# Patient Record
Sex: FEMALE | Race: WHITE | NOT HISPANIC OR LATINO | Employment: FULL TIME | URBAN - METROPOLITAN AREA
[De-identification: names, ages, dates, MRNs, and addresses within clinical notes are randomized per-mention and may not be internally consistent; named-entity substitution may affect disease eponyms.]

---

## 2018-06-05 ENCOUNTER — LAB VISIT (OUTPATIENT)
Dept: LAB | Facility: HOSPITAL | Age: 28
End: 2018-06-05
Attending: OBSTETRICS & GYNECOLOGY
Payer: COMMERCIAL

## 2018-06-05 DIAGNOSIS — N92.1 MENOMETRORRHAGIA: Primary | ICD-10-CM

## 2018-06-05 LAB
ABO + RH BLD: NORMAL
ANION GAP SERPL CALC-SCNC: 8 MMOL/L
B-HCG UR QL: NEGATIVE
BLD GP AB SCN CELLS X3 SERPL QL: NORMAL
BUN SERPL-MCNC: 9 MG/DL
CALCIUM SERPL-MCNC: 9.4 MG/DL
CHLORIDE SERPL-SCNC: 105 MMOL/L
CO2 SERPL-SCNC: 25 MMOL/L
CREAT SERPL-MCNC: 0.8 MG/DL
ERYTHROCYTE [DISTWIDTH] IN BLOOD BY AUTOMATED COUNT: 12.2 %
EST. GFR  (AFRICAN AMERICAN): >60 ML/MIN/1.73 M^2
EST. GFR  (NON AFRICAN AMERICAN): >60 ML/MIN/1.73 M^2
GLUCOSE SERPL-MCNC: 82 MG/DL
HCT VFR BLD AUTO: 37.8 %
HGB BLD-MCNC: 12.5 G/DL
MCH RBC QN AUTO: 29.3 PG
MCHC RBC AUTO-ENTMCNC: 33.1 G/DL
MCV RBC AUTO: 89 FL
PLATELET # BLD AUTO: 259 K/UL
PMV BLD AUTO: 10.4 FL
POTASSIUM SERPL-SCNC: 3.9 MMOL/L
RBC # BLD AUTO: 4.26 M/UL
SODIUM SERPL-SCNC: 138 MMOL/L
WBC # BLD AUTO: 6.83 K/UL

## 2018-06-05 PROCEDURE — 80048 BASIC METABOLIC PNL TOTAL CA: CPT

## 2018-06-05 PROCEDURE — 85027 COMPLETE CBC AUTOMATED: CPT

## 2018-06-05 PROCEDURE — 81025 URINE PREGNANCY TEST: CPT

## 2018-06-05 PROCEDURE — 86901 BLOOD TYPING SEROLOGIC RH(D): CPT

## 2018-06-05 PROCEDURE — 36415 COLL VENOUS BLD VENIPUNCTURE: CPT

## 2018-06-06 ENCOUNTER — SURGERY (OUTPATIENT)
Age: 28
End: 2018-06-06

## 2018-06-06 ENCOUNTER — ANESTHESIA (OUTPATIENT)
Dept: SURGERY | Facility: HOSPITAL | Age: 28
End: 2018-06-06
Payer: COMMERCIAL

## 2018-06-06 ENCOUNTER — ANESTHESIA EVENT (OUTPATIENT)
Dept: SURGERY | Facility: HOSPITAL | Age: 28
End: 2018-06-06
Payer: COMMERCIAL

## 2018-06-06 ENCOUNTER — HOSPITAL ENCOUNTER (OUTPATIENT)
Facility: HOSPITAL | Age: 28
Discharge: HOME | End: 2018-06-06
Attending: OBSTETRICS & GYNECOLOGY | Admitting: OBSTETRICS & GYNECOLOGY
Payer: MEDICAID

## 2018-06-06 VITALS
BODY MASS INDEX: 28.32 KG/M2 | TEMPERATURE: 98 F | RESPIRATION RATE: 16 BRPM | HEART RATE: 64 BPM | SYSTOLIC BLOOD PRESSURE: 120 MMHG | OXYGEN SATURATION: 98 % | WEIGHT: 170 LBS | HEIGHT: 65 IN | DIASTOLIC BLOOD PRESSURE: 83 MMHG

## 2018-06-06 DIAGNOSIS — N92.1 MENOMETRORRHAGIA: ICD-10-CM

## 2018-06-06 PROBLEM — N94.6 DYSMENORRHEA: Status: ACTIVE | Noted: 2018-06-06

## 2018-06-06 PROBLEM — N94.10 DYSPAREUNIA, FEMALE: Status: ACTIVE | Noted: 2018-06-06

## 2018-06-06 PROCEDURE — 88305 TISSUE EXAM BY PATHOLOGIST: CPT | Performed by: PATHOLOGY

## 2018-06-06 PROCEDURE — 71000015 HC POSTOP RECOV 1ST HR: Performed by: OBSTETRICS & GYNECOLOGY

## 2018-06-06 PROCEDURE — 00952 ANES VAG PX HYSTSC&/HSG: CPT | Performed by: OBSTETRICS & GYNECOLOGY

## 2018-06-06 PROCEDURE — 36000708 HC OR TIME LEV III 1ST 15 MIN: Performed by: OBSTETRICS & GYNECOLOGY

## 2018-06-06 PROCEDURE — 36000709 HC OR TIME LEV III EA ADD 15 MIN: Performed by: OBSTETRICS & GYNECOLOGY

## 2018-06-06 PROCEDURE — D9220A PRA ANESTHESIA: Mod: CRNA,,, | Performed by: NURSE ANESTHETIST, CERTIFIED REGISTERED

## 2018-06-06 PROCEDURE — 37000009 HC ANESTHESIA EA ADD 15 MINS: Performed by: OBSTETRICS & GYNECOLOGY

## 2018-06-06 PROCEDURE — 88305 TISSUE EXAM BY PATHOLOGIST: CPT | Mod: 26,,, | Performed by: PATHOLOGY

## 2018-06-06 PROCEDURE — 63600175 PHARM REV CODE 636 W HCPCS: Performed by: ANESTHESIOLOGY

## 2018-06-06 PROCEDURE — 25000003 PHARM REV CODE 250: Performed by: OBSTETRICS & GYNECOLOGY

## 2018-06-06 PROCEDURE — 71000033 HC RECOVERY, INTIAL HOUR: Performed by: OBSTETRICS & GYNECOLOGY

## 2018-06-06 PROCEDURE — 63600175 PHARM REV CODE 636 W HCPCS: Performed by: NURSE ANESTHETIST, CERTIFIED REGISTERED

## 2018-06-06 PROCEDURE — 63600175 PHARM REV CODE 636 W HCPCS: Performed by: OBSTETRICS & GYNECOLOGY

## 2018-06-06 PROCEDURE — S0028 INJECTION, FAMOTIDINE, 20 MG: HCPCS | Performed by: OBSTETRICS & GYNECOLOGY

## 2018-06-06 PROCEDURE — 27201423 OPTIME MED/SURG SUP & DEVICES STERILE SUPPLY: Performed by: OBSTETRICS & GYNECOLOGY

## 2018-06-06 PROCEDURE — 25000003 PHARM REV CODE 250: Performed by: NURSE ANESTHETIST, CERTIFIED REGISTERED

## 2018-06-06 PROCEDURE — 37000008 HC ANESTHESIA 1ST 15 MINUTES: Performed by: OBSTETRICS & GYNECOLOGY

## 2018-06-06 PROCEDURE — D9220A PRA ANESTHESIA: Mod: ANES,,, | Performed by: ANESTHESIOLOGY

## 2018-06-06 RX ORDER — FAMOTIDINE 20 MG/50ML
20 INJECTION, SOLUTION INTRAVENOUS ONCE
Status: COMPLETED | OUTPATIENT
Start: 2018-06-06 | End: 2018-06-06

## 2018-06-06 RX ORDER — DEXAMETHASONE SODIUM PHOSPHATE 4 MG/ML
INJECTION, SOLUTION INTRA-ARTICULAR; INTRALESIONAL; INTRAMUSCULAR; INTRAVENOUS; SOFT TISSUE
Status: DISCONTINUED | OUTPATIENT
Start: 2018-06-06 | End: 2018-06-06

## 2018-06-06 RX ORDER — SODIUM CHLORIDE, SODIUM LACTATE, POTASSIUM CHLORIDE, CALCIUM CHLORIDE 600; 310; 30; 20 MG/100ML; MG/100ML; MG/100ML; MG/100ML
125 INJECTION, SOLUTION INTRAVENOUS CONTINUOUS
Status: DISCONTINUED | OUTPATIENT
Start: 2018-06-06 | End: 2018-06-06 | Stop reason: HOSPADM

## 2018-06-06 RX ORDER — GLYCOPYRROLATE 0.2 MG/ML
INJECTION INTRAMUSCULAR; INTRAVENOUS
Status: DISCONTINUED | OUTPATIENT
Start: 2018-06-06 | End: 2018-06-06

## 2018-06-06 RX ORDER — MEPERIDINE HYDROCHLORIDE 50 MG/ML
INJECTION INTRAMUSCULAR; INTRAVENOUS; SUBCUTANEOUS
Status: DISCONTINUED | OUTPATIENT
Start: 2018-06-06 | End: 2018-06-06

## 2018-06-06 RX ORDER — MORPHINE SULFATE 10 MG/ML
2 INJECTION INTRAMUSCULAR; INTRAVENOUS; SUBCUTANEOUS EVERY 5 MIN PRN
Status: DISCONTINUED | OUTPATIENT
Start: 2018-06-06 | End: 2018-06-06 | Stop reason: HOSPADM

## 2018-06-06 RX ORDER — MIDAZOLAM HYDROCHLORIDE 1 MG/ML
INJECTION, SOLUTION INTRAMUSCULAR; INTRAVENOUS
Status: DISCONTINUED | OUTPATIENT
Start: 2018-06-06 | End: 2018-06-06

## 2018-06-06 RX ORDER — BUPIVACAINE HYDROCHLORIDE AND EPINEPHRINE 5; 5 MG/ML; UG/ML
INJECTION, SOLUTION EPIDURAL; INTRACAUDAL; PERINEURAL
Status: DISCONTINUED | OUTPATIENT
Start: 2018-06-06 | End: 2018-06-06 | Stop reason: HOSPADM

## 2018-06-06 RX ORDER — ONDANSETRON 2 MG/ML
4 INJECTION INTRAMUSCULAR; INTRAVENOUS DAILY PRN
Status: DISCONTINUED | OUTPATIENT
Start: 2018-06-06 | End: 2018-06-06 | Stop reason: HOSPADM

## 2018-06-06 RX ORDER — LIDOCAINE HYDROCHLORIDE 10 MG/ML
1 INJECTION, SOLUTION EPIDURAL; INFILTRATION; INTRACAUDAL; PERINEURAL ONCE
Status: CANCELLED | OUTPATIENT
Start: 2018-06-06 | End: 2018-06-06

## 2018-06-06 RX ORDER — NEOSTIGMINE METHYLSULFATE 1 MG/ML
INJECTION, SOLUTION INTRAVENOUS
Status: DISCONTINUED | OUTPATIENT
Start: 2018-06-06 | End: 2018-06-06

## 2018-06-06 RX ORDER — SODIUM CHLORIDE, SODIUM LACTATE, POTASSIUM CHLORIDE, CALCIUM CHLORIDE 600; 310; 30; 20 MG/100ML; MG/100ML; MG/100ML; MG/100ML
INJECTION, SOLUTION INTRAVENOUS CONTINUOUS
Status: DISCONTINUED | OUTPATIENT
Start: 2018-06-06 | End: 2018-06-06 | Stop reason: HOSPADM

## 2018-06-06 RX ORDER — DIPHENHYDRAMINE HYDROCHLORIDE 50 MG/ML
12.5 INJECTION INTRAMUSCULAR; INTRAVENOUS
Status: DISCONTINUED | OUTPATIENT
Start: 2018-06-06 | End: 2018-06-06 | Stop reason: HOSPADM

## 2018-06-06 RX ORDER — PROPOFOL 10 MG/ML
VIAL (ML) INTRAVENOUS
Status: DISCONTINUED | OUTPATIENT
Start: 2018-06-06 | End: 2018-06-06

## 2018-06-06 RX ORDER — SUCCINYLCHOLINE CHLORIDE 20 MG/ML
INJECTION INTRAMUSCULAR; INTRAVENOUS
Status: DISCONTINUED | OUTPATIENT
Start: 2018-06-06 | End: 2018-06-06

## 2018-06-06 RX ORDER — FAMOTIDINE 10 MG/ML
20 INJECTION INTRAVENOUS ONCE
Status: CANCELLED | OUTPATIENT
Start: 2018-06-06 | End: 2018-06-06

## 2018-06-06 RX ORDER — CISATRACURIUM BESYLATE 2 MG/ML
INJECTION, SOLUTION INTRAVENOUS
Status: DISCONTINUED | OUTPATIENT
Start: 2018-06-06 | End: 2018-06-06

## 2018-06-06 RX ORDER — ONDANSETRON 2 MG/ML
INJECTION INTRAMUSCULAR; INTRAVENOUS
Status: DISCONTINUED | OUTPATIENT
Start: 2018-06-06 | End: 2018-06-06

## 2018-06-06 RX ORDER — CEFAZOLIN SODIUM 2 G/50ML
2 SOLUTION INTRAVENOUS
Status: COMPLETED | OUTPATIENT
Start: 2018-06-06 | End: 2018-06-06

## 2018-06-06 RX ADMIN — NEOSTIGMINE METHYLSULFATE 2 MG: 1 INJECTION INTRAVENOUS at 02:06

## 2018-06-06 RX ADMIN — PROPOFOL 150 MG: 10 INJECTION, EMULSION INTRAVENOUS at 01:06

## 2018-06-06 RX ADMIN — MIDAZOLAM HYDROCHLORIDE 2 MG: 1 INJECTION, SOLUTION INTRAMUSCULAR; INTRAVENOUS at 01:06

## 2018-06-06 RX ADMIN — MEPERIDINE HYDROCHLORIDE 50 MG: 50 INJECTION INTRAMUSCULAR; INTRAVENOUS; SUBCUTANEOUS at 01:06

## 2018-06-06 RX ADMIN — ONDANSETRON 4 MG: 2 INJECTION INTRAMUSCULAR; INTRAVENOUS at 01:06

## 2018-06-06 RX ADMIN — BUPIVACAINE HYDROCHLORIDE AND EPINEPHRINE 30 ML: 5; 5 INJECTION, SOLUTION EPIDURAL; INTRACAUDAL; PERINEURAL at 01:06

## 2018-06-06 RX ADMIN — GLYCOPYRROLATE 0.2 MG: 0.2 INJECTION INTRAMUSCULAR; INTRAVENOUS at 01:06

## 2018-06-06 RX ADMIN — CEFAZOLIN SODIUM 2 G: 2 SOLUTION INTRAVENOUS at 01:06

## 2018-06-06 RX ADMIN — ONDANSETRON 4 MG: 2 INJECTION INTRAMUSCULAR; INTRAVENOUS at 02:06

## 2018-06-06 RX ADMIN — MORPHINE SULFATE 2 MG: 10 INJECTION INTRAVENOUS at 02:06

## 2018-06-06 RX ADMIN — FAMOTIDINE 20 MG: 20 INJECTION, SOLUTION INTRAVENOUS at 10:06

## 2018-06-06 RX ADMIN — CISATRACURIUM BESYLATE 4 MG: 2 INJECTION INTRAVENOUS at 01:06

## 2018-06-06 RX ADMIN — DEXAMETHASONE SODIUM PHOSPHATE 4 MG: 4 INJECTION, SOLUTION INTRAMUSCULAR; INTRAVENOUS at 01:06

## 2018-06-06 RX ADMIN — GLYCOPYRROLATE 0.2 MG: 0.2 INJECTION INTRAMUSCULAR; INTRAVENOUS at 02:06

## 2018-06-06 RX ADMIN — SUCCINYLCHOLINE CHLORIDE 120 MG: 20 INJECTION, SOLUTION INTRAMUSCULAR; INTRAVENOUS at 01:06

## 2018-06-06 RX ADMIN — SODIUM CHLORIDE, POTASSIUM CHLORIDE, SODIUM LACTATE AND CALCIUM CHLORIDE: 600; 310; 30; 20 INJECTION, SOLUTION INTRAVENOUS at 10:06

## 2018-06-06 NOTE — SUBJECTIVE & OBJECTIVE
Obstetric History     No data available        History reviewed. No pertinent past medical history.  Past Surgical History:   Procedure Laterality Date     SECTION, LOW TRANSVERSE      TUBAL LIGATION Bilateral        No prescriptions prior to admission.       Review of patient's allergies indicates:  No Known Allergies     Family History     Problem Relation (Age of Onset)    No Known Problems Mother, Father        Social History Main Topics    Smoking status: Never Smoker    Smokeless tobacco: Never Used    Alcohol use Yes      Comment: social    Drug use: No    Sexual activity: Yes     Partners: Male     Review of Systems   All other systems reviewed and are negative.     Objective:     Vital Signs (Most Recent):  Temp: 98.2 °F (36.8 °C) (18)  Pulse: 70 (18)  Resp: 16 (18)  BP: 120/80 (18)  SpO2: 98 % (18) Vital Signs (24h Range):  Temp:  [98.2 °F (36.8 °C)] 98.2 °F (36.8 °C)  Pulse:  [70] 70  Resp:  [16] 16  SpO2:  [98 %] 98 %  BP: (120)/(80) 120/80     Weight: 77.1 kg (170 lb)  Body mass index is 28.29 kg/m².    Patient's last menstrual period was 05/15/2018.    Physical Exam:    HENT:   Head: Normocephalic.    Eyes: EOM are normal.    Neck: Normal range of motion.    Cardiovascular: Normal rate, regular rhythm and normal heart sounds.     Pulmonary/Chest: Breath sounds normal.        Abdominal: Soft.     Genitourinary: Vagina normal.                Skin: Skin is warm.    Psychiatric: She has a normal mood and affect.       Laboratory:  I have personallly reviewed all pertinent lab results from the last 24 hours.    Diagnostic Results:  Labs: Reviewed  rev

## 2018-06-06 NOTE — TRANSFER OF CARE
"Anesthesia Transfer of Care Note    Patient: Kacie Martell    Procedure(s) Performed: Procedure(s) (LRB):  LAPAROSCOPY, DIAGNOSTIC (N/A)  HYSTEROSCOPY, WITH DILATION AND CURETTAGE OF UTERUS (N/A)  ABLATION, ENDOMETRIUM, THERMAL, HYSTEROSCOPIC (N/A)    Patient location: PACU    Anesthesia Type: general    Transport from OR: Transported from OR on room air with adequate spontaneous ventilation    Post pain: adequate analgesia    Post assessment: no apparent anesthetic complications and tolerated procedure well    Post vital signs: stable    Level of consciousness: awake, alert and oriented    Nausea/Vomiting: no nausea/vomiting    Complications: none    Transfer of care protocol was followed      Last vitals:   Visit Vitals  /80 (BP Location: Left arm, Patient Position: Lying)   Pulse 70   Temp 36.8 °C (98.2 °F) (Oral)   Resp 16   Ht 5' 5" (1.651 m)   Wt 77.1 kg (170 lb)   LMP 05/15/2018   SpO2 98%   Breastfeeding? No   BMI 28.29 kg/m²     "

## 2018-06-06 NOTE — ANESTHESIA POSTPROCEDURE EVALUATION
"Anesthesia Post Evaluation    Patient: Kacie Martell    Procedure(s) Performed: Procedure(s) (LRB):  LAPAROSCOPY, DIAGNOSTIC (N/A)  HYSTEROSCOPY, WITH DILATION AND CURETTAGE OF UTERUS (N/A)  ABLATION, ENDOMETRIUM, THERMAL, HYSTEROSCOPIC (N/A)    Final Anesthesia Type: general  Patient location during evaluation: PACU  Patient participation: Yes- Able to Participate  Level of consciousness: awake and alert  Post-procedure vital signs: reviewed and stable  Pain management: adequate  Airway patency: patent  PONV status at discharge: No PONV  Anesthetic complications: no      Cardiovascular status: blood pressure returned to baseline  Respiratory status: unassisted  Hydration status: euvolemic  Follow-up not needed.        Visit Vitals  /74   Pulse 65   Temp 36.8 °C (98.2 °F) (Oral)   Resp 13   Ht 5' 5" (1.651 m)   Wt 77.1 kg (170 lb)   LMP 05/15/2018   SpO2 99%   Breastfeeding? No   BMI 28.29 kg/m²       Pain/Barrington Score: Pain Assessment Performed: Yes (6/6/2018  2:49 PM)  Presence of Pain: denies (6/6/2018  9:50 AM)  Pain Rating Prior to Med Admin: 5 (6/6/2018  2:49 PM)  Pain Rating Post Med Admin: 3 (6/6/2018  2:49 PM)  Barrington Score: 9 (6/6/2018  2:50 PM)      "

## 2018-06-06 NOTE — BRIEF OP NOTE
[facility] - Periop Services  Brief Operative Note     SUMMARY     Surgery Date: 6/6/2018     Surgeon(s) and Role:     * Bobby Kelly MD - Primary    Assisting Surgeon: None    Pre-op Diagnosis:  Dyspareunia, female [N94.10]  Dysmenorrhea [N94.6]  Menometrorrhagia [N92.1]    Post-op Diagnosis:  Post-Op Diagnosis Codes:     * Dyspareunia, female [N94.10]     * Dysmenorrhea [N94.6]     * Menometrorrhagia [N92.1]    Procedure(s) (LRB):  LAPAROSCOPY, DIAGNOSTIC (N/A)  HYSTEROSCOPY, WITH DILATION AND CURETTAGE OF UTERUS (N/A)  ABLATION, ENDOMETRIUM, THERMAL, HYSTEROSCOPIC (N/A)    Anesthesia: General    Description of the findings of the procedure:sound 10 cm, ut width 4.3cm cx length 3.5cm, 157w power, 0.49 sec duration    Findings/Key Components:     Estimated Blood Loss: 10 mL         Specimens:   Specimen (12h ago through future)    None          Discharge Note    SUMMARY     Admit Date: 6/6/2018    Discharge Date and Time:  06/06/2018 2:10 PM    Hospital Course (synopsis of major diagnoses, care, treatment, and services provided during the course of the hospital stay):      Final Diagnosis: Post-Op Diagnosis Codes:     * Dyspareunia, female [N94.10]     * Dysmenorrhea [N94.6]     * Menometrorrhagia [N92.1]    Disposition: Home or Self Care    Follow Up/Patient Instructions:     Medications:  Reconciled Home Medications:      Medication List      You have not been prescribed any medications.         Discharge Procedure Orders  Diet Adult Regular     Activity as tolerated     Lifting restrictions   Order Comments: Lift less than 20 lbs     Other restrictions (specify):   Order Comments: No sex x 6 wks     Notify your health care provider if you experience any of the following:  temperature >100.4     Notify your health care provider if you experience any of the following:  severe uncontrolled pain     Notify your health care provider if you experience any of the following:  redness, tenderness,  or signs of infection (pain, swelling, redness, odor or green/yellow discharge around incision site)     Notify your health care provider if you experience any of the following:  difficulty breathing or increased cough     Notify your health care provider if you experience any of the following:  severe persistent headache     Notify your health care provider if you experience any of the following:   Order Comments: Increased pain     Change dressing (specify)   Order Comments: If aquasel dressing, do not remove dressing. And you may shower with it on.  It will be removed in the office at one week.       Follow-up Information     Bobby Kelly MD In 2 weeks.    Specialty:  Obstetrics and Gynecology  Contact information:  [address] [place] [address] [phone]

## 2018-06-06 NOTE — H&P
[facility] - Peri Services  Obstetrics & Gynecology  History & Physical    Patient Name: Kacie Martell  MRN: [id number]  Admission Date: 2018  Primary Care Provider: Primary Doctor No    Subjective:     Chief Complaint/Reason for Admission: 28 yo ....dysmenorrhea, dysparunia, menometro.  Prior cs and ltc. emb prolif    History of Present Illness:  28 yo ....dysmenorrhea, dysparunia, menometro.  Prior cs and ltc. emb prolif        Obstetric History     No data available        History reviewed. No pertinent past medical history.  Past Surgical History:   Procedure Laterality Date     SECTION, LOW TRANSVERSE      TUBAL LIGATION Bilateral        No prescriptions prior to admission.       Review of patient's allergies indicates:  No Known Allergies     Family History     Problem Relation (Age of Onset)    No Known Problems Mother, Father        Social History Main Topics    Smoking status: Never Smoker    Smokeless tobacco: Never Used    Alcohol use Yes      Comment: social    Drug use: No    Sexual activity: Yes     Partners: Male     Review of Systems   All other systems reviewed and are negative.     Objective:     Vital Signs (Most Recent):  Temp: 98.2 °F (36.8 °C) (18)  Pulse: 70 (18 09)  Resp: 16 (18)  BP: 120/80 (18)  SpO2: 98 % (18) Vital Signs (24h Range):  Temp:  [98.2 °F (36.8 °C)] 98.2 °F (36.8 °C)  Pulse:  [70] 70  Resp:  [16] 16  SpO2:  [98 %] 98 %  BP: (120)/(80) 120/80     Weight: 77.1 kg (170 lb)  Body mass index is 28.29 kg/m².    Patient's last menstrual period was 05/15/2018.    Physical Exam:    HENT:   Head: Normocephalic.    Eyes: EOM are normal.    Neck: Normal range of motion.    Cardiovascular: Normal rate, regular rhythm and normal heart sounds.     Pulmonary/Chest: Breath sounds normal.        Abdominal: Soft.     Genitourinary: Vagina normal.                Skin: Skin is warm.    Psychiatric:  She has a normal mood and affect.       Laboratory:  I have personallly reviewed all pertinent lab results from the last 24 hours.    Diagnostic Results:  Labs: Reviewed  rev    Assessment/Plan:    Dx lap, d and c, gta, hcope     No new Assessment & Plan notes have been filed under this hospital service since the last note was generated.  Service: Obstetrics and Gynecology      Bobby Kelly MD  Obstetrics & Gynecology  [facility]

## 2018-06-06 NOTE — ANESTHESIA PREPROCEDURE EVALUATION
06/06/2018  Kacie Martell is a 27 y.o., female.    Anesthesia Evaluation    I have reviewed the Patient Summary Reports.    I have reviewed the Nursing Notes.   I have reviewed the Medications.     Review of Systems  Anesthesia Hx:  No problems with previous Anesthesia        Physical Exam  General:  Well nourished    Airway/Jaw/Neck:  Airway Findings: Mouth Opening: Normal Tongue: Normal  General Airway Assessment: Adult  Mallampati: I  TM Distance: Normal, at least 6 cm  Jaw/Neck Findings:  Neck ROM: Normal ROM       Chest/Lungs:  Chest/Lungs Findings: Clear to auscultation     Heart/Vascular:  Heart Findings: Rate: Normal  Rhythm: Regular Rhythm        Mental Status:  Mental Status Findings:  Cooperative, Alert and Oriented         Anesthesia Plan  Type of Anesthesia, risks & benefits discussed:  Anesthesia Type:  general  Patient's Preference:   Intra-op Monitoring Plan: standard ASA monitors  Intra-op Monitoring Plan Comments:   Post Op Pain Control Plan: IV/PO Opioids PRN  Post Op Pain Control Plan Comments:   Induction:   IV  Beta Blocker:  Patient is not currently on a Beta-Blocker (No further documentation required).       Informed Consent: Patient understands risks and agrees with Anesthesia plan.  Questions answered. Anesthesia consent signed with patient.  ASA Score: 1     Day of Surgery Review of History & Physical: I have interviewed and examined the patient. I have reviewed the patient's H&P dated:            Ready For Surgery From Anesthesia Perspective.

## 2018-06-07 NOTE — OP NOTE
DATE OF PROCEDURE:  2018    SURGEON:  Bobby Kelly M.D.    ANESTHESIOLOGIST:  Kev Givens M.D.    ANESTHESIA:  General.    PREOPERATIVE DIAGNOSES:  Dysmenorrhea, dyspareunia, menometrorrhagia.    POSTOPERATIVE DIAGNOSES:  Dysmenorrhea, dyspareunia, menometrorrhagia,  adhesions from the anterior uterus to the anterior abdominal wall from her  prior  section.    PROCEDURE:  Dilatation and curettage, diagnostic hysteroscopy, global  thermal ablation with NovaSure device.    PROCEDURE IN DETAIL:  After proper consents were obtained, the patient was  taken to the Operating Room, given general anesthesia.  She was placed in  Cash stirrups, prepped and draped in the usual fashion for combined  abdominal vaginal procedure.  Examination under anesthesia revealed a  uterus that was normal size, shape and position.  No adnexal masses were  palpated.  Irby was placed into the bladder.  A single-tooth tenaculum  grasped at the anterior lip of the cervix.  Cervix was dilated to #15 Kumar  dilator.  Hysteroscope was inserted.  Sounding length 10 cm, cervical  length 3.5 cm, cavity length 6.5 cm and endocervical followed by an  endometrial curettage was performed.  There were no endometrial lesions  seen on hysteroscopy.  NovaSure device was then inserted and engaged.   Cervical width was 4.3 cm.  Negative leak was noted.  Cauterization with  157 helm of power and time 49 seconds.  The NovaSure device was then  disengaged and removed.  Hysteroscope reinserted.  Good cauterization was  noted throughout the uterine cavity and the isthmus.  A Kocher clamp was  placed in the endocervical canal.  A left upper quadrant incision was made  with a scalpel.  A 5-mm trocar inserted.  Abdomen was insufflated with CO2.   A suprapubic incision was made with a scalpel.  A 5-mm trocar was inserted  under direct visualization.  The liver, appendix, cul-de-sac, tubes and  ovaries were normal.  Prior tubal ligations were noted  bilaterally.  There  was no evidence of endometriosis.  There were dense adhesions from the  anterior abdominal wall to the anterior aspect of the uterus from her   section.  The abdomen was deinsufflated.  Primary and secondary  trocars were removed.  The incisions were sutured with 4-0 Vicryl.  A  single-tooth tenaculum and Guzman cannula were removed.  The patient was  extubated and taken to Recovery Room in good condition.        DY/IN dd: 2018 14:14:43 (CDT)   td: 2018 22:01:14 (DONOVAN)  Doc ID #[id number]   Job ID #[id number]    CC:

## 2018-06-25 ENCOUNTER — TELEPHONE (OUTPATIENT)
Dept: TRANSPLANT | Facility: CLINIC | Age: 28
End: 2018-06-25

## 2018-06-25 NOTE — TELEPHONE ENCOUNTER
Kacie Jayshree called and stated that she is interested in becoming a living donor for Elvin Vanessa, who is the  of her friend.  Medical and social history obtained.  No contraindications noted.  All questions answered.  Education book emailed to patient. Instructed to contact me if she would like to be tested after reviewing the information. Patient agreed and verbalized understanding.

## 2018-06-26 ENCOUNTER — TELEPHONE (OUTPATIENT)
Dept: TRANSPLANT | Facility: CLINIC | Age: 28
End: 2018-06-26

## 2018-06-26 DIAGNOSIS — Z00.5 TRANSPLANT DONOR EVALUATION: Primary | ICD-10-CM

## 2018-06-26 NOTE — TELEPHONE ENCOUNTER
Patient called, information reviewed and she would like to have blood test to see if she is a match with Mr. Vanessa. Crossmatch scheduled 6/27/18.

## 2018-06-27 ENCOUNTER — LAB VISIT (OUTPATIENT)
Dept: LAB | Facility: HOSPITAL | Age: 28
End: 2018-06-27
Attending: NURSE PRACTITIONER
Payer: COMMERCIAL

## 2018-06-27 DIAGNOSIS — Z00.5 TRANSPLANT DONOR EVALUATION: ICD-10-CM

## 2018-06-27 LAB — ABO + RH BLD: NORMAL

## 2018-06-27 PROCEDURE — 81376 HLA II TYPING 1 LOCUS LR: CPT | Mod: PO,TXP

## 2018-06-27 PROCEDURE — 81373 HLA I TYPING 1 LOCUS LR: CPT | Mod: PO,TXP

## 2018-06-27 PROCEDURE — 36415 COLL VENOUS BLD VENIPUNCTURE: CPT | Mod: PO,TXP

## 2018-06-27 PROCEDURE — 81376 HLA II TYPING 1 LOCUS LR: CPT | Mod: 91,PO,TXP

## 2018-06-27 PROCEDURE — 81373 HLA I TYPING 1 LOCUS LR: CPT | Mod: 91,PO,TXP

## 2018-06-27 PROCEDURE — 86901 BLOOD TYPING SEROLOGIC RH(D): CPT | Mod: TXP

## 2018-07-03 ENCOUNTER — TELEPHONE (OUTPATIENT)
Dept: TRANSPLANT | Facility: CLINIC | Age: 28
End: 2018-07-03

## 2018-07-03 NOTE — TELEPHONE ENCOUNTER
Patient notified that the results of the crossmatch with Mr. Vanessa show that they are compatible. Explained that the recipient needs additional testing before we can move on with donor testing. Patient verbalized understanding and has spoken with the recipient's wife about the situation. I will notify her once the recipient is cleared and testing can begin. Patient agreed.

## 2018-07-10 LAB — HLATY INTERPRETATION: NORMAL

## 2018-07-12 LAB
HLA DRB4 1: NORMAL
HLA SSO DNA TYPING CLASS I & II INTERPRETATION: NORMAL
HLA-A 1 SERO. EQUIV: 1
HLA-A 1: NORMAL
HLA-A 2 SERO. EQUIV: 3
HLA-A 2: NORMAL
HLA-B 1 SERO. EQUIV: 7
HLA-B 1: NORMAL
HLA-B 2 SERO. EQUIV: 8
HLA-B 2: NORMAL
HLA-BW 1 SERO. EQUIV: 6
HLA-BW 2 SERO. EQUIV: NORMAL
HLA-C 1: NORMAL
HLA-C 2: NORMAL
HLA-CW 1 SERO. EQUIV: 7
HLA-CW 2 SERO. EQUIV: NORMAL
HLA-DQ 1 SERO. EQUIV: 8
HLA-DQ 2 SERO. EQUIV: 6
HLA-DQB1 1: NORMAL
HLA-DQB1 2: NORMAL
HLA-DRB1 1 SERO. EQUIV: 4
HLA-DRB1 1: NORMAL
HLA-DRB1 2 SERO. EQUIV: 15
HLA-DRB1 2: NORMAL
HLA-DRB3 1: NORMAL
HLA-DRB3 2: NORMAL
HLA-DRB345 1 SERO. EQUIV: 53
HLA-DRB345 2 SERO. EQUIV: 51
HLA-DRB4 2: NORMAL
HLA-DRB5 1: NORMAL
HLA-DRB5 2: NORMAL
SSDQB TESTING DATE: NORMAL
SSDRB TESTING DATE: NORMAL
SSOA TESTING DATE: NORMAL
SSOB TESTING DATE: NORMAL
SSOC TESTING DATE: NORMAL
SSODR TESTING DATE: NORMAL
TYSSO TESTING DATE: NORMAL

## 2018-08-10 ENCOUNTER — TELEPHONE (OUTPATIENT)
Dept: TRANSPLANT | Facility: CLINIC | Age: 28
End: 2018-08-10

## 2018-08-10 DIAGNOSIS — Z00.5 TRANSPLANT DONOR EVALUATION: Primary | ICD-10-CM

## 2018-08-10 NOTE — TELEPHONE ENCOUNTER
Patient called stating that recipient is now active on the waitlist and would like to proceed with evaluation.  Discussed necessary testing required.  Pt stated she had a recent Pap Smear done this year with an [facility] provider.  Contact information for Nery Evangelista was provided.  All questions answered and verbalized understanding.

## 2018-08-27 ENCOUNTER — HOSPITAL ENCOUNTER (OUTPATIENT)
Dept: RADIOLOGY | Facility: HOSPITAL | Age: 28
Discharge: HOME | End: 2018-08-27
Attending: NURSE PRACTITIONER
Payer: COMMERCIAL

## 2018-08-27 DIAGNOSIS — Z00.5 TRANSPLANT DONOR EVALUATION: ICD-10-CM

## 2018-08-27 PROCEDURE — 71046 X-RAY EXAM CHEST 2 VIEWS: CPT | Mod: 26,TXP,, | Performed by: RADIOLOGY

## 2018-08-27 PROCEDURE — 71046 X-RAY EXAM CHEST 2 VIEWS: CPT | Mod: TC,FY,PO,TXP

## 2018-08-31 ENCOUNTER — HOSPITAL ENCOUNTER (OUTPATIENT)
Dept: RADIOLOGY | Facility: HOSPITAL | Age: 28
Discharge: HOME | End: 2018-08-31
Attending: TRANSPLANT SURGERY
Payer: COMMERCIAL

## 2018-08-31 ENCOUNTER — TELEPHONE (OUTPATIENT)
Dept: TRANSPLANT | Facility: CLINIC | Age: 28
End: 2018-08-31

## 2018-08-31 ENCOUNTER — HOSPITAL ENCOUNTER (OUTPATIENT)
Dept: CARDIOLOGY | Facility: CLINIC | Age: 28
Discharge: HOME | End: 2018-08-31
Payer: COMMERCIAL

## 2018-08-31 DIAGNOSIS — Z00.5 TRANSPLANT DONOR EVALUATION: ICD-10-CM

## 2018-08-31 PROCEDURE — A9562 TC99M MERTIATIDE: HCPCS | Mod: TXP

## 2018-08-31 PROCEDURE — 78707 K FLOW/FUNCT IMAGE W/O DRUG: CPT | Mod: 26,TXP,, | Performed by: RADIOLOGY

## 2018-08-31 PROCEDURE — 93000 ELECTROCARDIOGRAM COMPLETE: CPT | Mod: S$GLB,TXP,, | Performed by: INTERNAL MEDICINE

## 2018-08-31 NOTE — TELEPHONE ENCOUNTER
----- Message from Jane Terry sent at 8/31/2018 11:14 AM CDT -----  Contact: patient  Needs Advice    Reason for call:    Patient called to discuss her HIV and Mono test results.  Communication Preference: [phone]  Additional Information: n/a

## 2018-09-04 ENCOUNTER — OFFICE VISIT (OUTPATIENT)
Dept: TRANSPLANT | Facility: CLINIC | Age: 28
End: 2018-09-04
Payer: COMMERCIAL

## 2018-09-04 ENCOUNTER — OFFICE VISIT (OUTPATIENT)
Dept: PSYCHIATRY | Facility: CLINIC | Age: 28
End: 2018-09-04
Payer: COMMERCIAL

## 2018-09-04 ENCOUNTER — DOCUMENTATION ONLY (OUTPATIENT)
Dept: TRANSPLANT | Facility: CLINIC | Age: 28
End: 2018-09-04

## 2018-09-04 ENCOUNTER — HOSPITAL ENCOUNTER (OUTPATIENT)
Dept: RADIOLOGY | Facility: HOSPITAL | Age: 28
Discharge: HOME | End: 2018-09-04
Attending: TRANSPLANT SURGERY
Payer: COMMERCIAL

## 2018-09-04 VITALS
RESPIRATION RATE: 17 BRPM | DIASTOLIC BLOOD PRESSURE: 70 MMHG | WEIGHT: 165.38 LBS | BODY MASS INDEX: 26.58 KG/M2 | HEART RATE: 79 BPM | SYSTOLIC BLOOD PRESSURE: 129 MMHG | OXYGEN SATURATION: 99 % | HEIGHT: 66 IN | TEMPERATURE: 98 F

## 2018-09-04 DIAGNOSIS — Z01.818 PRE-OP EXAM: Primary | ICD-10-CM

## 2018-09-04 DIAGNOSIS — Z00.5 TRANSPLANT DONOR EVALUATION: ICD-10-CM

## 2018-09-04 DIAGNOSIS — Z00.5 WILLING TO BE KIDNEY DONOR: ICD-10-CM

## 2018-09-04 DIAGNOSIS — Z52.4 KIDNEY DONOR: ICD-10-CM

## 2018-09-04 PROCEDURE — 99202 OFFICE O/P NEW SF 15 MIN: CPT | Mod: S$GLB,TXP,, | Performed by: SURGERY

## 2018-09-04 PROCEDURE — 99205 OFFICE O/P NEW HI 60 MIN: CPT | Mod: S$GLB,TXP,, | Performed by: NURSE PRACTITIONER

## 2018-09-04 PROCEDURE — 3008F BODY MASS INDEX DOCD: CPT | Mod: CPTII,S$GLB,TXP, | Performed by: NURSE PRACTITIONER

## 2018-09-04 PROCEDURE — 96101 PR PSYCHOLOGIC TESTING BY PSYCH/PHYS: CPT | Mod: S$GLB,TXP,, | Performed by: PSYCHOLOGIST

## 2018-09-04 PROCEDURE — 90791 PSYCH DIAGNOSTIC EVALUATION: CPT | Mod: S$GLB,TXP,, | Performed by: PSYCHOLOGIST

## 2018-09-04 PROCEDURE — 96102 PR PSYCHOLOGIC TESTING BY TECHNICIAN: CPT | Mod: 59,S$GLB,TXP, | Performed by: PSYCHOLOGIST

## 2018-09-04 PROCEDURE — 74174 CTA ABD&PLVS W/CONTRAST: CPT | Mod: TC,TXP

## 2018-09-04 PROCEDURE — 25500020 PHARM REV CODE 255: Mod: TXP | Performed by: TRANSPLANT SURGERY

## 2018-09-04 PROCEDURE — 99999 PR PBB SHADOW E&M-EST. PATIENT-LVL III: CPT | Mod: PBBFAC,TXP,,

## 2018-09-04 PROCEDURE — 74174 CTA ABD&PLVS W/CONTRAST: CPT | Mod: 26,TXP,, | Performed by: RADIOLOGY

## 2018-09-04 RX ADMIN — IOHEXOL 125 ML: 350 INJECTION, SOLUTION INTRAVENOUS at 01:09

## 2018-09-04 NOTE — PROGRESS NOTES
TRANSPLANT DONOR PSYCHOSOCIAL ASSESSMENT    Kacie Martell  [address]  [facility] [address]    Telephone Information:   Mobile [phone]     Home [phone] (home)  Work  There is no work phone number on file.  E-mail  [email or web address]    PHS Increased Risk Behavioral Questionnaire reviewed by : Yes   addressed any PHS increased risk behaviors or concerns. Resources and education provided as appropriate.    Sex: female  YOB: 1990  Age: 28 y.o.    Encounter Date: 9/4/2018  [place] Citizen: yes  Primary Language: English   Needed: no    Potential Recipient: [facility]   Clinic Number: [id number]  Donor's Relationship to Patient: friends     Emergency Contact:  Name: Damian Bee  Relationship: significant other  Address: same as patient  Phone Numbers:   [phone] (mobile)    Family/Social Support:   Number of dependents/: 1 Pt has a 3 year old son who will be cared for by his father and family  Marital history:   Other family dynamics: Pt has a good relationship with her fathers family. Pt has a lot of family as well to assist with transplant.    Household Composition:  Name: Antonio Patel  Age: 3  Relationship: son  Does person drive? no     Name: Damian Bee  Age: 41  Relationship: son  Does person drive? no       Do you and your caregivers have access to reliable transportation? yes  PRIMARY CAREGIVER: Damian Bee will be primary caregiver, phone number [phone].      provided in-depth information to patient and caregiver regarding pre- and post-transplant caregiver role.   strongly encourages patient and caregiver to have concrete plan regarding post-transplant care giving, including back-up caregiver(s) to ensure care giving needs are met as needed.    Patient and Caregiver states understanding all aspects of caregiver role/commitment and is able/willing/committed to being caregiver to the  fullest extent necessary.    Patient and Caregiver verbalizes understanding of the education provided today and caregiver responsibilities.         remains available. Patient and Caregiver agree to contact  in a timely manner if concerns arise.      Able to take time off work without financial concerns: yes.     Additional Significant Others who will Assist with Transplant:  Name: Jules Patel- [phone]  Age: 30  City: [place] State: [place]  Relationship: ex   Does person drive? yes      Living Will: no  Healthcare Power of : no  Advance Directives on file: <no information> per medical record.  Verbally reviewed LW/HCPA information.   provided patient with copy of LW/HCPA documents and provided education on completion of forms.    Education: high school  Reading Ability: 12th grade  Reports difficulty with: N/A  Learns [employer]:  multisensory     Status: no  VA Benefits: no     Employment:  Pt works full time at [employer]  Fundraising and [employer] information provided to patient.  Patient and Caregiver verbalizes understanding.   remains available.    Spouse/Significant Other Employment: Pt's boyfriends works for the same company    Insurance: see potential recipient's insurance for donor coverage.    Does the donor have health insurance? Yes    Patient and Caregiver verbalizes clear understanding that patient may experience difficulty obtaining and/or be denied insurance coverages post-surgery.  This includes and is not limited to disability insurance, life insurance, health insurance, burial insurance, long term care insurance, and other insurances.  Patient also reports understanding that future health concerns related to or unrelated to transplantation may not be covered by patient's insurance.  Resources and information provided and reviewed.      Patient and Caregiver provides verbal permission to release any necessary information  to outside resources for patient care and discharge planning.  Resources and information provided and reviewed.      Infusion Service: patient utilizing? no  Home Health: patient utilizing? no  DME: no  ADLS:  Pt reports she can perform all of her ADLs with ease    Adherence:   Pt reports complete adherence to all medical advise.   Adherence education and counseling provided    Per History Section:  Past Medical History:   Diagnosis Date    Dysmenorrhea     Menometrorrhagia      Social History     Tobacco Use    Smoking status: Never Smoker    Smokeless tobacco: Never Used   Substance Use Topics    Alcohol use: Yes     Comment: social     Social History     Substance and Sexual Activity   Drug Use No     Social History     Substance and Sexual Activity   Sexual Activity Yes    Partners: Male       Per Today's Psychosocial:  Tobacco: none, patient denies any use.  Alcohol: none, patient denies any use.  Illicit Drugs/Non-prescribed Medications: none, patient denies any use.    Patient and Caregiver states clear understanding of the potential impact of substance use as it relates to donor candidacy and is agreeable to random substance screening.  Substance abstinence/cessation counseling, education and resources provided and reviewed.     Arrests/DWI/Treatment/Rehab: patient denies    Psychiatric History:    Mental Health: pt denies any overwhelming feelings of sadness or anxiety at this time. Patient denies being dx with any mental health disorders of any kind.  Psychiatrist/Counselor: pt denies  Medications:  Pt denies  Suicide/Homicide Issues: pt denies any previous or current through of SI/HI.    Safety at home: pt confirms feelings safe at home and denies any history of trauma of any kind.    Donation Knowledge/Expectations: Patient and Caregiver states having clear understanding and realistic expectations regarding the potential risks and potential benefits of organ transplantation and organ donation and  agrees to discuss with health care team members and support system members, as well as to utilize available resources and express questions and/or concerns in order to further facilitate the patients informed decision-making.  Resources and information provided and reviewed. .    Decision-making Process: Pt reports that she has been interested in donating for a while, but did not want to donate to just anyone. Since she has known her friends and found out about the process, she reports she is willing and did not hesitate.  Patient states understanding that transplant and donation are not a guarantee that the donated organ will function.  Patient states understanding of kidney treatment options available for kidney patients, psychosocial aspects surrounding organ donation and transplantation as well as recovery.  Patient also states clear understanding that patient may choose to not donate at any time prior to surgery taking place, and that patient confidentiality is protected.  Patient reports expected compliance with health care regime and states understanding of importance of compliance.  Educational information provided.    Patient reports having a clear understanding  that risks and benefits may be involved with organ transplantation and with organ donation and  of the treatment options available to a potential transplant recipient. Patient reports clear understanding that psychosocial risk factors which may affect patient, including but not limited to feelings of depression, generalized anxiety, anxiety regarding dependence on others, post traumatic stress disorder, feelings of guilt and other emotional and/or mental concerns, and/or exacerbation of existing mental health concerns.     Detailed resources and education provided and discussed. Patient agrees to access appropriate resources in a timely manner as needed and to communicate concerns appropriately.      Feelings or Concerns: . Patient denies feelings  "of coercion, pressure or obligation to donate. Patient states that patient is not receiving any compensation for organ donation. Patient reports motivation to pursue organ donation at this time.     Patient reports having clear and realistic expectations and understanding of the many psychosocial aspects involved with being a living organ donor, including but not limited to costs, compliance, medications, lab work, procedures, appointments, financial planning, preparedness, timely and appropriate communication of concerns, abstinence from non-prescribed drugs or substances, importance of adherence to and follow-through with all health care team recommendations, participation in health care and treatment planning, utilization of resources and follow-through, mental health counseling as needed and/or recommended, and the patient and caregiver responsibilities.  Patient states having a clear understanding of possible difficulty obtaining or possibility of being denied insurance coverage post-surgery.  This includes and is not limited to disability insurance, life insurance, health insurance, burial insurance, long-term care insurance and other insurances.  Educational and resource information provided and reviewed.  Patient also reports understanding that future health concerns related to or unrelated to organ donation may not be covered by patients insurance.    Coping:  Pt reports she is coping well with the process. Pt denies any concerns and reports that she has "normal anxiety".    Interview Behavior: Kacie presents as alert and oriented x 4, pleasant, good eye contact, well groomed, recall good, concentration/judgement good, average intelligence, calm, communicative, cooperative and asking and answering questions appropriately.      Suitability for Donation: Kacie Martell presents as a suitable candidate for donation at this time.    Recommendations/Additional Comments: SW recommends that pt conduct " fundraising to assist pt with pay for cost of medication, food,gas, and other transplant related expenses. SW recommends that pt remain free of all tobacco,ETOH, and substance use. SW remains available to assist with any concerns that may arise as pt navigates through the transplant process.

## 2018-09-04 NOTE — PROGRESS NOTES
Kidney Transplant Donor Evaluation    Subjective:       CC:  Initial evaluation of kidney donor candidacy.    HPI:  Ms. Martell is a 28 y.o. year old White female who has presented to be evaluated as a potential living unrelated donor for the  of her friend.  Ms. Martell reports being here without coercion, payment, guilt or other alternative motives other than wanting to help someone with kidney disease.    Patient denies any history of coronary artery disease, stroke, seizure disorder, chronic obstructive pulmonary disease, liver disease, kidney stones, gallstones, deep venous thrombosis, pulmonary embolism, recurrent urinary tract infections or malignancies.    A0      Reports C section for failure to progress. Reports a  UTI during pregnancy , but none otherwise    Lab Results   Component Value Date    HGBA1C 4.8 2018       Family History   Problem Relation Age of Onset    No Known Problems Mother     No Known Problems Father      Past Surgical History:   Procedure Laterality Date     SECTION, LOW TRANSVERSE      TUBAL LIGATION Bilateral      Social History     Tobacco Use    Smoking status: Never Smoker    Smokeless tobacco: Never Used   Substance Use Topics    Alcohol use: Yes     Comment: social    Drug use: No     Past Medical History:   Diagnosis Date    Dysmenorrhea     Menometrorrhagia      Lab Results   Component Value Date    WBC 6.02 2018    HGB 13.1 2018    HCT 40.2 2018    MCV 90 2018     2018     Lab Results   Component Value Date    CREATININE 0.8 2018    BUN 8 2018     2018    K 3.7 2018     2018    CO2 25 2018       Review of Systems   Constitutional: Negative for activity change, appetite change, chills, fatigue, fever and unexpected weight change.   HENT: Negative for congestion, facial swelling, postnasal drip, rhinorrhea, sinus pressure, sore throat and trouble  "swallowing.    Eyes: Negative for pain, redness and visual disturbance.   Respiratory: Negative for cough, chest tightness, shortness of breath and wheezing.    Cardiovascular: Negative.  Negative for chest pain, palpitations and leg swelling.   Gastrointestinal: Negative for abdominal pain, diarrhea, nausea and vomiting.   Genitourinary: Negative for dysuria, flank pain and urgency.   Musculoskeletal: Negative for gait problem, neck pain and neck stiffness.   Skin: Negative for rash.   Allergic/Immunologic: Negative for environmental allergies, food allergies and immunocompromised state.   Neurological: Negative for dizziness, weakness, light-headedness and headaches.   Psychiatric/Behavioral: Negative for agitation and confusion. The patient is not nervous/anxious.        Objective:  /70 (BP Location: Right arm, Patient Position: Sitting, BP Method: Medium (Automatic))   Pulse 79   Temp 98.1 °F (36.7 °C) (Oral)   Resp 17   Ht 5' 6" (1.676 m)   Wt 75 kg (165 lb 5.5 oz)   SpO2 99%   BMI 26.69 kg/m²      Physical Exam   Constitutional: She is oriented to person, place, and time. She appears well-developed and well-nourished.   HENT:   Head: Normocephalic.   Mouth/Throat: Oropharynx is clear and moist. No oropharyngeal exudate.   Eyes: Conjunctivae and EOM are normal. Pupils are equal, round, and reactive to light. No scleral icterus.   Neck: Normal range of motion. Neck supple.   Cardiovascular: Normal rate, regular rhythm and normal heart sounds.   Pulmonary/Chest: Effort normal and breath sounds normal.   Abdominal: Soft. Normal appearance and bowel sounds are normal. She exhibits no distension and no mass. There is no splenomegaly or hepatomegaly. There is no tenderness. There is no rebound, no guarding, no CVA tenderness, no tenderness at McBurney's point and negative Martins's sign.   Musculoskeletal: Normal range of motion. She exhibits no edema.   Lymphadenopathy:     She has no cervical " adenopathy.   Neurological: She is alert and oriented to person, place, and time. She exhibits normal muscle tone. Coordination normal.   Skin: Skin is warm and dry.   Psychiatric: She has a normal mood and affect. Her behavior is normal.   Vitals reviewed.      Labs:  8/27/2018: Creatinine 0.8 mg/dL (Ref range: 0.5 - 1.4 mg/dL); Creatinine, Random Ur 52.0 mg/dL (Ref range: 15.0 - 325.0 mg/dL); Urine, Creatinine 57.0 mg/dL (Ref range: 15.0 - 325.0 mg/dL); BUN, Bld 8 mg/dL (Ref range: 6 - 20 mg/dL)  8/27/2018: Nitrite, UA Negative (Ref range: Negative)  ABO type: O POS    Assessment:     1. Willing to be kidney donor        Plan:   Body mass index is 26.69 kg/m².  Bp Acceptable       Donor Candidacy:   Based on the given information, Ms. Martell appears to be a suitable candidate for kidney donation.  A final recommendation will be made by the selection committee after reviewing her complete workup.    Rosario Garza NP       Counseling:   I discussed with Ms. Martell that donation is voluntary and reiterated it should be done willingly and for altruistic reasons only.  I reviewed that no payment should be received for donating.  I also discussed that coercion, guilt, pressure, or feelings of obligation are not appropriate reasons to donate.  The option to withdraw at any time was emphasized.  Ms. Martell was reminded that a medical opt out can be given to protect her confidentiality, and no member of the transplant team will discuss specifics of her health or medical/social history with anyone else without permission.  The need for lifelong routine medical follow-up for optimal health, including routine health maintenance was reviewed.    We also discussed the long term risks associated with kidney donation.  I told the patient that her GFR should return to within 75-80% of pre-donation level within six months of donation.  I informed the patient that there is a small risk of developing albuminuria and hypertension  following donor nephrectomy.  I also informed the patient that based on current literature, the risk of developing end-stage renal disease following donor nephrectomy is similar to the general population.    I reviewed with Ms. Martell available lab results and other diagnostics from the evaluation process    Additional Counseling:   The patient was counseled on the need for regular follow-up with a primary care physician for blood pressure and cholesterol screening.  The importance of age appropriate health screening was also emphasized.    Follow-up:   prn    Altogether, 30 minutes of this encounter were spent on counseling, which was greater than 50% of the total visit time.

## 2018-09-04 NOTE — PROGRESS NOTES
PHARMEdithD. PRE-TRANSPLANT NOTE:    This patient's medication therapy was evaluated as part of her pre-transplant evaluation.    The following pharmacologic concerns were noted: None      No current outpatient medications on file.     No current facility-administered medications for this visit.          Currently the patient is responsible for preparing / administering this patient's medications on a daily basis.  I am available for consultation and can be contacted, as needed by the other members of the Kidney Transplant team.

## 2018-09-04 NOTE — PROGRESS NOTES
Transplant Surgery Kidney Donor Evaluation     Referring Physician:     Subjective:     Chief Complaint: Kacie Martell is a 28 y.o. year old female who presents today wishing to be evaluated as a potential living donor.    History of Present Illness:  Kacie reports being here without coercion, payment, guilt, or other alternative motives other than wanting to help someone with kidney disease.    Review of Systems   Constitutional: Negative for fatigue.   HENT: Negative for drooling, postnasal drip and sore throat.    Eyes: Negative for discharge and itching.   Respiratory: Negative for choking and stridor.    Gastrointestinal: Negative for rectal pain.   Endocrine: Negative for polydipsia.   Genitourinary: Negative for enuresis and genital sores.   Musculoskeletal: Negative for back pain, neck pain and neck stiffness.   Allergic/Immunologic: Negative for immunocompromised state.   Neurological: Negative for facial asymmetry and numbness.   Hematological: Negative for adenopathy.   Psychiatric/Behavioral: Negative for behavioral problems, self-injury and suicidal ideas.       Objective:   Physical Exam   HENT:   Head: Normocephalic.   Eyes: Pupils are equal, round, and reactive to light.   Neck: No JVD present. No tracheal deviation present. No thyromegaly present.   Cardiovascular: Normal rate, normal heart sounds and intact distal pulses.   Pulses:       Femoral pulses are 2+ on the right side, and 2+ on the left side.  Pulmonary/Chest: No respiratory distress. She has no wheezes. She has no rales. She exhibits no tenderness.   Abdominal: Soft. She exhibits no ascites and no mass. There is no hepatosplenomegaly. There is no tenderness. There is no guarding. No hernia.       Pfannenstiel, laparoscopic tubal ligation    Lymphadenopathy:     She has no cervical adenopathy.   Neurological: She is alert. She has normal strength.   Skin: Skin is warm and dry.   Psychiatric: She has a normal mood and affect. Her  behavior is normal. Judgment and thought content normal.     ABO type: O POS    Diagnoses:  1. Willing to be kidney donor             Transplant Surgery - Candidacy   Assessment/Plan:     Donor Candidacy: Based on information available thus far, Kacie is an excellent candidate for living kidney donation.    Oscar Brown MD       Education: I discussed with the patient the requirements for donation including the compatibility of blood and tissue typing, healthy by physical examination and workup, as well as the desire to donate.  We discussed the risks related to surgery including the risks related to anesthesia, bleeding, infection, inability for surgery to be performed laparoscopically, risks of reoperation as well as the risks of death.  We discussed the length of hospitalization, return to work times, as well as follow-up post-donation.    I also discussed the slight possibility that due to problems with the recipient operation, the transplant might not be able to be completed after the organ was already removed. If such a situation should arise, the donor requests autotransplantation. She understands that autotransplantation may not be feasible or medically advisable, and that autotransplant surgery will not be done if  the risks to the donor clearly outweigh potential benefit.    I discussed the possibility that living donor sometimes encounter problems obtaining health insurance, or could have higher premium despite ongoing efforts of transplant professionals to educate insurance companies on this issue.    I discussed with Kacie that donation is a voluntary activity, and reiterated it should be done willingly and for altruistic reasons only.  I reviewed that no payment should be made for donating.  I also discussed that coersion, guilt, pressure, or feelings of obligation are not appropriate reasons to donate.  The option to withdraw at any time was emphasized.  Kacie was reminded that a medical opt  out can be given to protect her confidentiality, and no member of the transplant team will discuss specifics of her health or medical/social history with anyone else without permission.  The need for lifelong routine medical follow-up for optimal health, including routine health maintenance was reviewed.    Additionally, I discussed the need for our program to be able to contact living donors for UNOS reporting purposes for a minimum of 2 years.  Failure of our center to be able to provide such information could jeopardize our ability to continue to offer living donor transplants.  Kacie voices understanding and agrees to this follow-up.    I discussed the UNOS requirement for centers to report donor status for a minimum of two years. Kacie understands that failure to comply with requirement could have adverse consequences for our transplant program, and agrees to cooperate with all our required follow-up.    I reviewed with Kacie available lab results and other diagnostics from the evaluation process.

## 2018-09-04 NOTE — PROGRESS NOTES
"DONOR TEACHING NOTE    Met with Kacie Martell today in clinic to review living donor education.        Topics covered included:  · Kidney donation is done voluntarily and of the donor's free will.  Process can stop at any time.   · Better success rates than cadaveric donation, shorter waiting time for the recipient: less than the 3-5 year wait on the list, more time to prepare: tests/surgery can be planned  · Laboratory studies of blood and urine.  Health exams: records of GYN/pap/mammogram (females); evaluation by transplant team and a psychiatrist (if indicated); diagnostic Tests: CXR, EKG, TB skin test, 24-hour urine collection, renal scan, CT of abdomen to assess kidney anatomy, and stress test (if indicated)  · Team will review workup for approval.  Copy of the approved criteria for donation given to patient.  Surgeon will decide which kidney will be donated.   · Benefits of laparoscopic nephrectomy: less pain, shorter hospital stay, a shorter recovery period.  Risks discussed: bleeding, deep vein thrombosis, pulmonary embolus, the need for re-operation.  Your operation may be converted to an "open" procedure if the surgeon feels it is medically necessary.  · To recovery room, transfer to TSU, if space allows or semi-private room.  Irby catheter/IV, average hospital stay is 1 day.  At discharge the cost of any prescriptions is the donor's responsibility.  · Post-operative visit 4 weeks after surgery or prior to returning to work, unless a complication arises and you need to be seen sooner.  Off of work for about 3 to 6 weeks, no driving for at least the first 3 weeks.  General surgical complications: infection, allergic reaction, and anesthesia.   · Long life considerations of living with one kidney: risk of HTN and kidney failure   · Following up with PCP 6 months after donation then yearly thereafter to monitor kidney function.  This should include weight, labs, urinalysis, and a blood pressure check.  We " are required to report your progress to [facility] at 6 months, 1 year, and 2 years post-donation.     Written educational material provided. Informed consent reviewed and signed. All questions were answered and patient verbalized understanding.     Patient is a suitable candidate for living kidney donation.

## 2018-09-05 ENCOUNTER — TELEPHONE (OUTPATIENT)
Dept: PSYCHIATRY | Facility: CLINIC | Age: 28
End: 2018-09-05

## 2018-09-05 NOTE — PROGRESS NOTES
Psychiatry Initial Visit (PhD/LCSW)    Date:   9/4/2018    CPT Code: 82676    Referred by:  Rosario Garza NP    Chief complaint/reason for encounter:  Psychological Evaluation prior to donation of a kidney to an unrelated person    Clinical status of patient:  Outpatient    Met with:  Patient    History of present illness: Ms. Kacie Martell is a 28-year-old white  female referred for Psychological Evaluation prior to donating a kidney to an unrelated person. She would like to donate a kidney to Elvin Vanessa . He is the  of her friend of 1 ½ years. She and the wife of the recipient met because when they both had their horses at the same barn and they became friends. Ms. Martell reported she has been thinking about donating a kidney for quite some time after the father of a friend of hers needed multiple organ transplants. She reported she has been really thinking about it the past year. She did not want to donate to a stranger because she did not know how they would treat the transplant. She had considered donating to a coworker in the past but decided against it because he was on drugs and might not take care of the organ. When she saw on Facebook a post by her friend saying that her  needed a transplant, she decided to get tested and was a match. An additional factor is that Mr. Vanessa has a 17-year-old daughter and Ms. Martell doesnt know what she would have done without her father and mother being there for her. She indicated there was no coercion or offer of payment for donation.  She was aware she could change her mind at any time without negative consequences.  She understands that she cannot donate a kidney again.  She has a 3-year-old son and hopes that if he or anyone in her family would need a transplant in the future, that someone would help them out. She is also reassured because there is no kidney disease in her family. She understands that her future insurability may be  affected.  She plans to look into life insurance soon. She is aware that there may be a psychological let down after surgery.  She indicated that there was no financial hardship.  Her work schedule is flexible and she can do some work from home. She was clearly competent to make the decision to donate.    Ms. Martell has no prior psychiatric history. She also denied any current psychological symptoms. She was pleasant and cooperative in interview. She appeared to be in good spirits.    Pain scale: noncontributory    Symptoms:  Mood: denied  Anxiety:  denied  Substance abuse: denied  Cognitive functioning: denied    Psychiatric history: none      Medical history:  willing to be kidney donor, dysmenorrhea, menometrorrhagia, , and tubal ligation.       Family history of psychiatric illness:  none    Social history (marriage, employment, etc.):  High school graduate. 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  and  once. 1 son age 3. Lives with son and significant other. Ep[employer]. Enjoys horses, biking, and working.      Substance use:   Alcohol: socially   Drugs: none   Tobacco: none   Caffeine: 1-2 cups coffee per day    Strengths and Liabilities:   Strength:  Pt is expressive/articulate.   Liability:  None identified.    Mental Status Exam:  General appearance:  appears stated age, neatly dressed, well groomed  Speech:  normal rate and tone  Level of cooperation:  cooperative  Thought processes:  logical, goal-directed  Mood:  euthymic  Thought content:  no illusions, no visual hallucinations, no auditory hallucinations, no delusions, no active or passive homicidal thoughts, no active or passive suicidal ideation, no obsessions, no compulsions, no violence  Affect:  appropriate  Orientation:  oriented to person, place, and time  Memory:  Recent memory:  2 of 3 objects after brief delay.  Denied memory problems in  day to day life. Adds she has had an overwhelming day  Remote memory - intact  Attention span and concentration:  spelled HOUSE forward and backwards  Fund of general knowledge: 4 of 4 recent presidents  Abstract reasoning:    Similarities: abstract.    Proverbs: no idea  Judgment and insight: fair  Language:  intact    Diagnostic impressions:  Z01.818 Pre-op exam  Z52.4 Kidney donor    Plan:  Pt will complete Psychological testing.  Report of Psychological Evaluation will follow in the Notes folder in the patients chart in the encounter titled Psychological Testing.    Length of time:  50 minutes

## 2018-09-05 NOTE — PSYCH TESTING
[facility] [address] [place]  [address]  [phone]    REPORT OF PSYCHOLOGICAL EVALUATION    NAME:  Kacie Martell  OC #:  [id number]  :   1990    REFERRED BY: Rosario Garza N.P.    EVALUATED BY:  Madeline Kramer, Ph.D., Clinical Psychologist  Raissa Kaur, Psychometrician    DATES OF EVALUATION: 2018 & 2018    EVALUATION PROCEDURES AND TIMES:  Conducted by Psychologist:  Interpretation and report of test data  Integration of information from diagnostic interview, medical record, and testing data  Conducted by Technician:  [place] Multiphasic Personality Inventory - 2 - Restructured Form (MMPI-2-RF)  [place] Clinical Multiaxial Inventory - III (MCMI-III)  Key Depression Inventory - II (BDI-II)  Key Anxiety Inventory (YASH)  Time:  CPT Code 47475 - 2 hours; CPT Code 47682 - 1 hour    EVALUATION FINDINGS:  The diagnostic interview revealed that Ms. Kacie Martell is a 28-year-old white  female referred for Psychological Evaluation prior to donating a kidney to an unrelated person. She would like to donate a kidney to Elvin Vanessa Jr. He is the  of her friend of 1 ½ years. She and the wife of the recipient met because when they both had their horses at the same barn and they became friends. Ms. Martell reported she has been thinking about donating a kidney for quite some time after the father of a friend of hers needed multiple organ transplants. She reported she has been really thinking about it the past year. She did not want to donate to a stranger because she did not know how they would treat the transplant. She had considered donating to a coworker in the past but decided against it because he was on drugs and might not take care of the organ. When she saw on Facebook a post by her friend saying that her  needed a transplant, she decided to get tested and was a match. An additional factor is that Mr. Vanessa has a 17-year-old daughter and Ms.  Jayshree doesnt know what she would have done without her father and mother being there for her. She indicated there was no coercion or offer of payment for donation.  She was aware she could change her mind at any time without negative consequences.  She understands that she cannot donate a kidney again.  She has a 3-year-old son and hopes that if he or anyone in her family would need a transplant in the future, that someone would help them out. She is also reassured because there is no kidney disease in her family. She understands that her future insurability may be affected.  She plans to look into life insurance soon. She is aware that there may be a psychological let down after surgery.  She indicated that there was no financial hardship.  Her work schedule is flexible and she can do some work from home. She was clearly competent to make the decision to donate. The Mental Status Exam suggested reduced recent memory and abstraction ability. She denied having any memory problems in day to day life and added that she has had an overwhelming day.    Ms. Martell has no prior psychiatric history. She also denied any current psychological symptoms. She was pleasant and cooperative in interview. She appeared to be in good spirits.    The medical record also revealed history of willing to be kidney donor, dysmenorrhea, menometrorrhagia, , and tubal ligation.       TEST DATA:  Psychological Testing data revealed that she was fully cooperative and engaged in the assessment process. She is a high school graduate. She is employed full time doing automobile repossession. She was alert and cooperative during the evaluation.  Effort on all tests was satisfactory to produce valid results.    The MMPI-2-RF profile validity scales raised concerns about the possible impact of under-reporting on the validity of this protocol. She presented herself as very well-adjusted. This reported level of psychological adjustment is  relatively rare in the general population. The demand of the testing situation may have contributed to this approach to testing. There were no indications of somatic or cognitive complaints, or of emotional, thought, behavioral, or interpersonal dysfunction. The profile was within normal limits and suggested no psychodiagnostic considerations. There was no suggestion of anxiety, depression, substance abuse, or a thought disorder.    The MCMI-III findings suggested she is a well-functioning adult who is facing just minor stressors. Test data suggested she is concerned with public appearances, that is, with being seen by others as composed, virtuous, and conventional in her behavior. She likely attempts to downplay any distressing emotions and tries to deny troublesome relationships with others. The demand of the testing situation may have contributed to this approach to testing. There were no indications of personality disorder or an Axis I disorder such as depression or anxiety.     The BDI-II revealed the presence of minimal depression with a total score of 0.      The YASH revealed the presence of minimal anxiety with a total score of 2.     DIAGNOSTIC IMPRESSIONS:  Z01.818 Pre-op exam  Z52.4 Kidney donor    SUMMARY AND RECOMMENDATIONS:  Ms. Martell has no prior psychiatric history. She denied any current psychological symptoms. She appeared to be in good spirits in interview. Test data were within normal limits with no suggestion of anxiety, depression, substance abuse, or a thought disorder. This evaluation revealed no contraindications to kidney donation from the psychological perspective. No recommendations are forthcoming.        Interpretation and report and coding were completed on 9/5/2018.

## 2018-09-05 NOTE — TELEPHONE ENCOUNTER
Report of Psychological Evaluation is completed. It can be accessed through the Chart Review activity under the Notes tab to the right of the Encounters tab.  It is titled Psych Testing.    There are no contraindications to kidney donation from the psychological perspective. No recommendations.  Thanks. EC

## 2018-09-14 ENCOUNTER — COMMITTEE REVIEW (OUTPATIENT)
Dept: TRANSPLANT | Facility: CLINIC | Age: 28
End: 2018-09-14

## 2018-09-14 NOTE — COMMITTEE REVIEW
Kacie Martell was presented at selection committee on 9/14/18 . Patient did meet selection criteria for living kidney donation. No absolute contraindications noted. Donor advocate will be scheduled.     CT scan reviewed, will use the left kidney for donation.    Lab reviewed by Dr. Espinoza    Patient notified of committee decision. All questions were answered and patient verbalized understanding. Information provided to schedule surgery.     Note written by Amaris Tolliver RN    ===============================================================  I was present at the meeting and attest to the decision of the committee

## 2018-09-19 ENCOUNTER — TELEPHONE (OUTPATIENT)
Dept: TRANSPLANT | Facility: CLINIC | Age: 28
End: 2018-09-19

## 2018-09-19 NOTE — TELEPHONE ENCOUNTER
Called patient to discuss that recipient will need additional testing prior to clearance for transplant.  Informed patient that she will be notified once patient is cleared for surgery to discuss surgery date.  All questions answered and patient verbalized understanding.

## 2018-09-27 ENCOUNTER — TELEPHONE (OUTPATIENT)
Dept: TRANSPLANT | Facility: CLINIC | Age: 28
End: 2018-09-27

## 2018-09-27 NOTE — TELEPHONE ENCOUNTER
Called patient to discuss the status of scheduling surgery.  Recipient has not been cleared at this time and will need additional testing.  Pt verbalized understanding.

## 2018-10-02 ENCOUNTER — TELEPHONE (OUTPATIENT)
Dept: TRANSPLANT | Facility: CLINIC | Age: 28
End: 2018-10-02

## 2018-10-02 NOTE — TELEPHONE ENCOUNTER
----- Message from Jonathan Guzman MD sent at 10/2/2018 12:20 PM CDT -----  Regarding: RE: donor CT  Would favor right side   Left side will end up in two arteries   ----- Message -----  From: Amaris Tolliver  Sent: 9/14/2018  10:42 AM  To: Jonathan Guzman MD  Subject: donor CT                                         Please review donor CT

## 2018-10-11 ENCOUNTER — TELEPHONE (OUTPATIENT)
Dept: TRANSPLANT | Facility: CLINIC | Age: 28
End: 2018-10-11

## 2018-10-11 NOTE — TELEPHONE ENCOUNTER
Discussed with patient that if surgeons decide to use right nephrectomy for donation that the surgery date will need to be moved to 11/5/18 due to staffing pending recipient clearance.  Pt was ok with this as she preferred an earlier date.   This will be discussed on 10/12/18 to confirm.  Also discussed that BMI must be 30 or lower for right kidney donation.  Pt's current BMI is within parameters.  All questions answered and patient verbalized understanding.

## 2018-10-12 ENCOUNTER — COMMITTEE REVIEW (OUTPATIENT)
Dept: TRANSPLANT | Facility: CLINIC | Age: 28
End: 2018-10-12

## 2018-10-12 NOTE — COMMITTEE REVIEW
CT scan reviewed at selection committee, patient will have a right nephrectomy.  I was present at the meeting and attest to the decision of the committee.

## 2018-10-16 ENCOUNTER — TELEPHONE (OUTPATIENT)
Dept: TRANSPLANT | Facility: CLINIC | Age: 28
End: 2018-10-16

## 2018-10-16 DIAGNOSIS — Z00.5 TRANSPLANT DONOR EVALUATION: Primary | ICD-10-CM

## 2018-10-17 NOTE — TELEPHONE ENCOUNTER
Confirmed surgery on 11/5/18 and pre-op on 10/30/18.  Reviewed all necessary testing for pre-op appointment.  Denies any blood thinner or hormonal use at this time.  Discussed the possibility of cancellation due to a busy service.  Discussed need to contact team with any change in medical condition.  Denies need for disability paperwork.  All questions answered and patient verbalized understanding.

## 2018-10-22 ENCOUNTER — TELEPHONE (OUTPATIENT)
Dept: PREADMISSION TESTING | Facility: HOSPITAL | Age: 28
End: 2018-10-22

## 2018-10-22 NOTE — TELEPHONE ENCOUNTER
----- Message from Nery Evangelista sent at 10/22/2018 12:04 PM CDT -----  Regarding: Need Pre-Op appt.  Please scheduled this patient on Briseida Hunter's schedule.  Date: 10/30/2018 at 02:00 PM.  Kidney Donor, Surg Date: 11/05/18-[facility].  Let me know if you have any questions regarding this request.    Thanks:  Nery

## 2018-10-30 ENCOUNTER — CLINICAL SUPPORT (OUTPATIENT)
Dept: TRANSPLANT | Facility: CLINIC | Age: 28
End: 2018-10-30
Payer: COMMERCIAL

## 2018-10-30 ENCOUNTER — OFFICE VISIT (OUTPATIENT)
Dept: TRANSPLANT | Facility: CLINIC | Age: 28
End: 2018-10-30
Payer: COMMERCIAL

## 2018-10-30 ENCOUNTER — ANESTHESIA EVENT (OUTPATIENT)
Dept: SURGERY | Facility: HOSPITAL | Age: 28
DRG: 661 | End: 2018-10-30
Payer: COMMERCIAL

## 2018-10-30 ENCOUNTER — HOSPITAL ENCOUNTER (OUTPATIENT)
Dept: PREADMISSION TESTING | Facility: HOSPITAL | Age: 28
Discharge: HOME | End: 2018-10-30
Attending: ANESTHESIOLOGY
Payer: COMMERCIAL

## 2018-10-30 VITALS
HEART RATE: 78 BPM | RESPIRATION RATE: 16 BRPM | OXYGEN SATURATION: 95 % | BODY MASS INDEX: 26.58 KG/M2 | BODY MASS INDEX: 26.58 KG/M2 | HEART RATE: 78 BPM | SYSTOLIC BLOOD PRESSURE: 129 MMHG | RESPIRATION RATE: 16 BRPM | DIASTOLIC BLOOD PRESSURE: 78 MMHG | DIASTOLIC BLOOD PRESSURE: 78 MMHG | BODY MASS INDEX: 26.58 KG/M2 | HEIGHT: 66 IN | DIASTOLIC BLOOD PRESSURE: 78 MMHG | HEART RATE: 78 BPM | SYSTOLIC BLOOD PRESSURE: 129 MMHG | OXYGEN SATURATION: 95 % | HEIGHT: 66 IN | TEMPERATURE: 99 F | TEMPERATURE: 99 F | WEIGHT: 165.38 LBS | OXYGEN SATURATION: 95 % | WEIGHT: 165.38 LBS | WEIGHT: 165.38 LBS | TEMPERATURE: 99 F | SYSTOLIC BLOOD PRESSURE: 129 MMHG | RESPIRATION RATE: 16 BRPM | HEIGHT: 66 IN

## 2018-10-30 VITALS
DIASTOLIC BLOOD PRESSURE: 65 MMHG | BODY MASS INDEX: 26.58 KG/M2 | TEMPERATURE: 99 F | RESPIRATION RATE: 16 BRPM | SYSTOLIC BLOOD PRESSURE: 135 MMHG | OXYGEN SATURATION: 95 % | HEART RATE: 78 BPM | HEIGHT: 66 IN | WEIGHT: 165.38 LBS

## 2018-10-30 VITALS
TEMPERATURE: 100 F | BODY MASS INDEX: 26.84 KG/M2 | HEIGHT: 66 IN | DIASTOLIC BLOOD PRESSURE: 69 MMHG | RESPIRATION RATE: 16 BRPM | WEIGHT: 167 LBS | HEART RATE: 62 BPM | OXYGEN SATURATION: 99 % | SYSTOLIC BLOOD PRESSURE: 119 MMHG

## 2018-10-30 DIAGNOSIS — Z00.5 WILLING TO BE KIDNEY DONOR: Primary | ICD-10-CM

## 2018-10-30 PROCEDURE — 99999 PR PBB SHADOW E&M-EST. PATIENT-LVL III: CPT | Mod: PBBFAC,TXP,, | Performed by: TRANSPLANT SURGERY

## 2018-10-30 PROCEDURE — 99215 OFFICE O/P EST HI 40 MIN: CPT | Mod: S$GLB,TXP,, | Performed by: INTERNAL MEDICINE

## 2018-10-30 PROCEDURE — 3008F BODY MASS INDEX DOCD: CPT | Mod: CPTII,S$GLB,TXP, | Performed by: INTERNAL MEDICINE

## 2018-10-30 PROCEDURE — 99999 PR PBB SHADOW E&M-EST. PATIENT-LVL III: CPT | Mod: PBBFAC,TXP,,

## 2018-10-30 PROCEDURE — 99999 PR PBB SHADOW E&M-EST. PATIENT-LVL III: CPT | Mod: PBBFAC,TXP,, | Performed by: INTERNAL MEDICINE

## 2018-10-30 PROCEDURE — 99499 UNLISTED E&M SERVICE: CPT | Mod: S$GLB,TXP,, | Performed by: TRANSPLANT SURGERY

## 2018-10-30 RX ORDER — HEPARIN SODIUM 5000 [USP'U]/ML
5000 INJECTION, SOLUTION INTRAVENOUS; SUBCUTANEOUS
Status: CANCELLED | OUTPATIENT
Start: 2018-10-30

## 2018-10-30 NOTE — PROGRESS NOTES
Transplant Surgery Kidney Donor Preoperative Evaluation    Subjective:   CC:  Preoperative evaluation before donor nephrectomy.    HPI:  Ms. Martell is a 28 y.o. year old White female who has been approved to be a living unrelated donor for a friend.  She denies any changes in her health condition since her previous visit.      Past Medical History:   Diagnosis Date    Dysmenorrhea     Menometrorrhagia      Past Surgical History:   Procedure Laterality Date    ABLATION, ENDOMETRIUM, THERMAL, HYSTEROSCOPIC N/A 2018    Performed by Bobby Kelly MD at [facility] OR     SECTION, LOW TRANSVERSE      DIAGNOSTIC LAPAROSCOPY N/A 2018    Procedure: LAPAROSCOPY, DIAGNOSTIC;  Surgeon: Bobby Kelly MD;  Location: [facility] OR;  Service: OB/GYN;  Laterality: N/A;    HYSTEROSCOPY WITH DILATION AND CURETTAGE OF UTERUS N/A 2018    Procedure: HYSTEROSCOPY, WITH DILATION AND CURETTAGE OF UTERUS;  Surgeon: Bobby Kelly MD;  Location: [facility] OR;  Service: OB/GYN;  Laterality: N/A;    HYSTEROSCOPY, WITH DILATION AND CURETTAGE OF UTERUS N/A 2018    Performed by Bobby Kelly MD at [facility] OR    LAPAROSCOPY, DIAGNOSTIC N/A 2018    Performed by Bobby Kelly MD at [facility] OR    THERMAL ABLATION OF ENDOMETRIUM USING HYSTEROSCOPY N/A 2018    Procedure: ABLATION, ENDOMETRIUM, THERMAL, HYSTEROSCOPIC;  Surgeon: Bobby Kelly MD;  Location: [facility] OR;  Service: OB/GYN;  Laterality: N/A;    TUBAL LIGATION Bilateral      Review of patient's allergies indicates:  No Known Allergies  Review of Systems   Constitutional: Negative for activity change and fever.   Respiratory: Negative for chest tightness, shortness of breath and stridor.    Cardiovascular: Negative for chest pain, palpitations and leg swelling.   Gastrointestinal: Negative for abdominal distention and abdominal pain.   Genitourinary: Negative for dysuria and frequency.   All other systems reviewed and are negative.      Objective:   Blood pressure  "129/78, pulse 78, temperature 98.6 °F (37 °C), temperature source Oral, resp. rate 16, height 5' 6.3" (1.684 m), weight 75 kg (165 lb 5.5 oz), SpO2 95 %.  Physical Exam   Constitutional: She appears well-developed and well-nourished.   Pulmonary/Chest: Effort normal.   Abdominal: Soft. Bowel sounds are normal.       Vitals reviewed.      ABO type: O POS    Imaging Studies:  Nuclear split function renal scan:   Left:  50%   Right: 50%  CT angiogram:   Left renal arteries: 1   Right renal arteries: 1   Other important findings: Very early left renal artery bifurcation      Assessment:     1. Willing to be kidney donor        Plan:   Transplant Surgery Donor Candidacy:   Ms. Martell remains a suitable candidate for kidney donation.    Based on the preoperative evaluation, a right robotic donor nephrectomy is planned.  Jose Yao MD       Counseling:   I discussed with Ms. Martell that donation is a voluntary activity and reiterated it should be done willingly and for altruistic reasons only.  I reviewed that no payment should be received for donating.  I also discussed that coercion, guilt, pressure, or feelings of obligation are not appropriate reasons to donate.  The option to withdraw at any time was emphasized.  Ms. Martell was reminded that a medical opt out can be given to protect her confidentiality, and no member of the transplant team will discuss specifics of her health or medical/social history with anyone else without permission.  The need for lifelong routine medical follow-up for optimal health, including routine health maintenance was reviewed.    I discussed the risks related to surgery including the risks related to anesthesia, bleeding, infection, inability for surgery to be performed laparoscopically, risks of reoperation as well as the risks of death.  We discussed the length of hospitalization, return to work times, as well as follow-up post-donation.    I also discussed the slight " possibility that due to problems with the recipient operation, the transplant might not be able to be completed after the organ was already removed. If such a situation should arise, the donor requests autotransplantation. She understands that autotransplantation may not be feasible or medically advisable, and that autotransplant surgery will not be done if  the risks to the donor clearly outweigh potential benefit.

## 2018-10-30 NOTE — Clinical Note
October 30, 2018                     [facility]  [address]  [facility] [address]  Phone: [phone]   Patient: Kacie Martell   MR Number: [id number]   YOB: 1990   Date of Visit: 10/30/2018       Dear      Thank you for referring Kacie Martell to me for evaluation. Attached you will find relevant portions of my assessment and plan of care.    If you have questions, please do not hesitate to call me. I look forward to following Kacie Martell along with you.    Sincerely,    Jose Yao MD    Enclosure    If you would like to receive this communication electronically, please contact [email or web address] or [phone] to request [employer] access.    [employer] is a tool which provides read-only access to select patient information with whom you have a relationship. Its easy to use and provides real time access to review your patients record including encounter summaries, notes, results, and demographic information.    If you feel you have received this communication in error or would no longer like to receive these types of communications, please e-mail [email or web address]

## 2018-10-30 NOTE — Clinical Note
October 30, 2018                     [facility]  [address]  [facility] [address]  Phone: [phone]   Patient: Kacie Martell   MR Number: [id number]   YOB: 1990   Date of Visit: 10/30/2018       Dear      Thank you for referring Kacie Martell to me for evaluation. Attached you will find relevant portions of my assessment and plan of care.    If you have questions, please do not hesitate to call me. I look forward to following Kacie Martell along with you.    Sincerely,    Tsering Espinoza MD    Enclosure    If you would like to receive this communication electronically, please contact [email or web address] or [phone] to request [employer] Link access.    [employer] Link is a tool which provides read-only access to select patient information with whom you have a relationship. Its easy to use and provides real time access to review your patients record including encounter summaries, notes, results, and demographic information.    If you feel you have received this communication in error or would no longer like to receive these types of communications, please e-mail [email or web address]

## 2018-10-30 NOTE — DISCHARGE INSTRUCTIONS
Your surgery has been scheduled for:__________________________________________    You should report to:  ____[facility], located on the [place] side of the first floor of the           [facility] ([phone])  ____[facility], located on the [facility] side of the            Second floor of the [facility] ([phone])  ____3rd Floor [facility] located on the [facility] side of the [facility] [phone]  Please Note   - Tell your doctor if you take Aspirin, products containing Aspirin, herbal medications  or blood thinners, such as Coumadin, Ticlid, or Plavix.  (Consult your provider regarding holding or stopping before surgery).  - Arrange for someone to drive you home following surgery.  You will not be allowed to leave the surgical facility alone or drive yourself home following sedation and anesthesia.  Before Surgery  - Stop taking all herbal medications 14days prior to surgery  - No Motrin/Advil (Ibuprofen) 7 days before surgery  - No Aleve (Naproxen) 7 days before surgery  - Stop Taking Asprin, products containing Asprin _____days before surgery  - Stop taking blood thinners_______days before surgery  - No Goody's/BC  Powder 7 days before surgery  - Refrain from drinking alcoholic beverages for 24hours before and after surgery  - Stop or limit smoking _________days before surgery  - You may take Tylenol for pain    Night before Surgery  NOTHING TO EAT OR DRINK AFTER MIDNIGHT OR FOLLOW SURGEON'S INSTRUCTIONS  - Take a shower or bath (shower is recommended).  Bathe with Hibiclens soap or an antibacterial soap from the neck down.  If not supplied by your surgeon, hibiclens soap will need to be purchased over the counter in pharmacy.  Rinse soap off thoroughly.  - Shampoo your hair with your regular shampoo  The Day of Surgery  ·  If you are told to take medication on the morning of surgery, it may be taken with  a sip of water.   - Take another bath or shower with hibiclens or any antibacterial soap, to reduce the chance of infection.  - Take heart and blood pressure medications with a small sip of water, as advised by the perioperative team.  - Do not take fluid pills  - You may brush your teeth and rinse your mouth, but do not swall any additional water.   - Do not apply perfumes, powder, body lotions or deodorant on the day of surgery.  - Nail polish should be removed.  - Do not wear makeup or moisturizer  - Wear comfortable clothes, such as a button front shirt and loose fitting pants.  - Leave all jewelry, including body piercings, and valuables at home.    - Bring any devices you will neeed after surgery such as crutches or canes.  - If you have sleep apnea, please bring your CPAP machine  In the event that your physical condition changes including the onset of a cold or respiratory illness, or if you have to delay or cancel your surgery, please notify your surgeon.      Anesthesia: General Anesthesia  Youre due to have surgery. During surgery, youll be given medication called anesthesia. (It is also called anesthetic.) This will keep you comfortable and pain-free. Your anesthesia provider will use general anesthesia. This sheet tells you more about it.  What is general anesthesia?     You are watched continuously during your procedure by the anesthesia provider   General anesthesia puts you into a state like deep sleep. It goes into the bloodstream (IV anesthetics), into the lungs (gas anesthetics), or both. You feel nothing during the procedure. You will not remember it. During the procedure, the anesthesia provider monitors you continuously. He or she checks your heart rate and rhythm, blood pressure, breathing, and blood oxygen.  · IV Anesthetics. IV anesthetics are given through an IV line in your arm. Theyre often given first. This is so you are asleep before a gas anesthetic is started. Some kinds of IV  anesthetics relieve pain. Others relax you. Your doctor will decide which kind is best in your case.  · Gas Anesthetics. Gas anesthetics are breathed into the lungs. They are often used to keep you asleep. They can be given through a facemask or a tube placed in your larynx or trachea (breathing tube).  ? If you have a facemask, your anesthesia provider will most likely place it over your nose and mouth while youre still awake. Youll breathe oxygen through the mask as your IV anesthetic is started. Gas anesthetic may be added through the mask.  ? If you have a tube in the larynx or trachea, it will be inserted into your throat after youre asleep.  Anesthesia tools and medications  You will likely have:  · IV anesthetics. These are put into an IV line into your bloodstream.  · Gas anesthetics. You breathe these anesthetics into your lungs, where they pass into your bloodstream.  · Pulse oximeter. This is a small clip that is attached to the end of your finger. This measures your blood oxygen level.  · Electrocardiography leads (electrodes). These are small sticky pads that are placed on your chest. They record your heart rate and rhythm.  · Blood pressure cuff. This reads your blood pressure.  Risks and possible complications  General anesthesia has some risks. These include:  · Breathing problems  · Nausea and vomiting  · Sore throat or hoarseness (usually temporary)  · Allergic reaction to the anesthetic  · Irregular heartbeat (rare)  · Cardiac arrest (rare)   Anesthesia safety  · Follow all instructions you are given for how long not to eat or drink before your procedure.  · Be sure your doctor knows what medications and drugs you take. This includes over-the-counter medications, herbs, supplements, alcohol or other drugs. You will be asked when those were last taken.  · Have an adult family member or friend drive you home after the procedure.  · For the first 24 hours after your surgery:  ? Do not drive or use  heavy equipment.  ? Have a trusted family member or spouse make important decisions or sign documents.  ? Avoid alcohol.  ? Have a responsible adult stay with you. He or she can watch for problems and help keep you safe.  Date Last Reviewed: 10/16/2014  © 1929-2324 [employer]. [address] [place] [address]. All rights reserved. This information is not intended as a substitute for professional medical care. Always follow your healthcare professional's instructions.

## 2018-10-30 NOTE — H&P (VIEW-ONLY)
"HPI:  Ms. Martell is a 28 y.o. year old White female who has been approved to be a living unrelated donor for a friend.  She denies any changes in her health condition since her previous visit.      Past Medical History:   Diagnosis Date    Dysmenorrhea     Menometrorrhagia      Past Surgical History:   Procedure Laterality Date    ABLATION, ENDOMETRIUM, THERMAL, HYSTEROSCOPIC N/A 2018    Performed by Bobby Kelly MD at [facility] OR     SECTION, LOW TRANSVERSE      DIAGNOSTIC LAPAROSCOPY N/A 2018    Procedure: LAPAROSCOPY, DIAGNOSTIC;  Surgeon: Bobby Kelly MD;  Location: [facility] OR;  Service: OB/GYN;  Laterality: N/A;    HYSTEROSCOPY WITH DILATION AND CURETTAGE OF UTERUS N/A 2018    Procedure: HYSTEROSCOPY, WITH DILATION AND CURETTAGE OF UTERUS;  Surgeon: Bobby Kelly MD;  Location: [facility] OR;  Service: OB/GYN;  Laterality: N/A;    HYSTEROSCOPY, WITH DILATION AND CURETTAGE OF UTERUS N/A 2018    Performed by [facility] at [facility] OR    LAPAROSCOPY, DIAGNOSTIC N/A 2018    Performed by Bobby Kelly MD at [facility] OR    THERMAL ABLATION OF ENDOMETRIUM USING HYSTEROSCOPY N/A 2018    Procedure: ABLATION, ENDOMETRIUM, THERMAL, HYSTEROSCOPIC;  Surgeon: Bobby Kelly MD;  Location: [facility] OR;  Service: OB/GYN;  Laterality: N/A;    TUBAL LIGATION Bilateral      Review of patient's allergies indicates:  No Known Allergies  Review of Systems   Constitutional: Negative for activity change and fever.   Respiratory: Negative for chest tightness, shortness of breath and stridor.    Cardiovascular: Negative for chest pain, palpitations and leg swelling.   Gastrointestinal: Negative for abdominal distention and abdominal pain.   Genitourinary: Negative for dysuria and frequency.   All other systems reviewed and are negative.      Objective:   Blood pressure 129/78, pulse 78, temperature 98.6 °F (37 °C), temperature source Oral, resp. rate 16, height 5' 6.3" (1.684 m), weight 75 kg (165 lb 5.5 " oz), SpO2 95 %.  Physical Exam   Constitutional: She appears well-developed and well-nourished.   Pulmonary/Chest: Effort normal.   Abdominal: Soft. Bowel sounds are normal.       Vitals reviewed.      ABO type: O POS    Imaging Studies:  Nuclear split function renal scan:   Left:  50%   Right: 50%  CT angiogram:   Left renal arteries: 1   Right renal arteries: 1   Other important findings: Very early left renal artery bifurcation      Assessment:     1. Willing to be kidney donor        Plan:   Transplant Surgery Donor Candidacy:   Ms. Martell remains a suitable candidate for kidney donation.    Based on the preoperative evaluation, a right robotic donor nephrectomy is planned.  Jose Yao MD
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
## 2018-10-30 NOTE — ANESTHESIA PREPROCEDURE EVALUATION
10/30/2018  Kacie Martell is a 28 y.o., female.    Anesthesia Evaluation         Review of Systems  Anesthesia Hx:  Hx of Anesthetic complications PONV History of prior surgery of interest to airway management or planning: Previous anesthesia: General laparoscopy, endometrial ablation 6/2018 with general anesthesia. Procedure performed at an [facility]. Denies Family Hx of Anesthesia complications.  Personal Hx of Anesthesia complications, Post-Operative Nausea/Vomiting, with every anesthetic, treatment not known   Social:  Non-Smoker, Social Alcohol Use    Hematology/Oncology:  Hematology Normal   Oncology Normal     EENT/Dental:   Glasses   Cardiovascular:  Cardiovascular Normal   Functional Capacity good / => 4 METS, walking, climb 1 FOS; denies CP, SOB    Pulmonary:   Denies Asthma.  Denies Sleep Apnea.    Renal/:  Renal/ Normal     Hepatic/GI:   Denies GERD.    OB/GYN/PEDS:  S/p Ablation 6/2018   Neurological:  Neurology Normal  Denies Pain    Endocrine:   Denies Diabetes.    Psych:  Phobia and Claustrophobia.         Physical Exam  General:  Well nourished    Airway/Jaw/Neck:  Airway Findings: Mouth Opening: Normal Tongue: Normal  General Airway Assessment: Adult  Mallampati: II  Improves to II with phonation.  TM Distance: Normal, at least 6 cm  Jaw/Neck Findings:     Neck ROM: Normal ROM      Dental:  Dental Findings: In tact   Chest/Lungs:  Chest/Lungs Findings: Clear to auscultation, Normal Respiratory Rate     Heart/Vascular:  Heart Findings: Rate: Normal  Rhythm: Regular Rhythm  Sounds: Normal        Mental Status:  Mental Status Findings:  Cooperative, Alert and Oriented       Pt was seen in POC 10/30/18/Briseida Hunter RN        Anesthesia Plan  Type of Anesthesia, risks & benefits discussed:  Anesthesia Type:  general  Patient's Preference:   Intra-op Monitoring Plan: standard  ASA monitors  Intra-op Monitoring Plan Comments:   Post Op Pain Control Plan: multimodal analgesia, IV/PO Opioids PRN and per primary service following discharge from PACU  Post Op Pain Control Plan Comments:   Induction:   IV  Beta Blocker:  Patient is not currently on a Beta-Blocker (No further documentation required).       Informed Consent: Patient understands risks and agrees with Anesthesia plan.  Questions answered. Anesthesia consent signed with patient.  ASA Score: 1     Day of Surgery Review of History & Physical:    H&P update referred to the surgeon.  H&P completed by Anesthesiologist.       Ready For Surgery From Anesthesia Perspective.

## 2018-10-30 NOTE — PROGRESS NOTES
PRE-OP TEACHING NOTE    Kacie Martell is here today for pre-op appointments.  Pre-op instructions reviewed and written information was provided.  All questions were answered.  Discussed possibility that surgery may be rescheduled if the program is busy with  donor transplants.  Patient agreed and verbalized understanding of all instructions.

## 2018-10-30 NOTE — H&P
"HPI:  Ms. Martell is a 28 y.o. year old White female who has been approved to be a living unrelated donor for a friend.  She denies any changes in her health condition since her previous visit.      Past Medical History:   Diagnosis Date    Dysmenorrhea     Menometrorrhagia      Past Surgical History:   Procedure Laterality Date    ABLATION, ENDOMETRIUM, THERMAL, HYSTEROSCOPIC N/A 2018    Performed by Bobby Kelly [facility] at [facility] OR     SECTION, LOW TRANSVERSE      DIAGNOSTIC LAPAROSCOPY N/A 2018    Procedure: LAPAROSCOPY, DIAGNOSTIC;  Surgeon: Bobby Kelly MD;  Location: [facility] OR;  Service: OB/GYN;  Laterality: N/A;    HYSTEROSCOPY WITH DILATION AND CURETTAGE OF UTERUS N/A 2018    Procedure: HYSTEROSCOPY, WITH DILATION AND CURETTAGE OF UTERUS;  Surgeon: Bobby Kelly MD;  Location: [facility] OR;  Service: OB/GYN;  Laterality: N/A;    HYSTEROSCOPY, WITH DILATION AND CURETTAGE OF UTERUS N/A 2018    Performed by [facility] at [facility] OR    LAPAROSCOPY, DIAGNOSTIC N/A 2018    Performed by Bobby Kelly MD at [facility] OR    THERMAL ABLATION OF ENDOMETRIUM USING HYSTEROSCOPY N/A 2018    Procedure: ABLATION, ENDOMETRIUM, THERMAL, HYSTEROSCOPIC;  Surgeon: Bobby Kelly MD;  Location: [facility] OR;  Service: OB/GYN;  Laterality: N/A;    TUBAL LIGATION Bilateral      Review of patient's allergies indicates:  No Known Allergies  Review of Systems   Constitutional: Negative for activity change and fever.   Respiratory: Negative for chest tightness, shortness of breath and stridor.    Cardiovascular: Negative for chest pain, palpitations and leg swelling.   Gastrointestinal: Negative for abdominal distention and abdominal pain.   Genitourinary: Negative for dysuria and frequency.   All other systems reviewed and are negative.      Objective:   Blood pressure 129/78, pulse 78, temperature 98.6 °F (37 °C), temperature source Oral, resp. rate 16, height 5' 6.3" (1.684 m), weight 75 kg (165 lb 5.5 " oz), SpO2 95 %.  Physical Exam   Constitutional: She appears well-developed and well-nourished.   Pulmonary/Chest: Effort normal.   Abdominal: Soft. Bowel sounds are normal.       Vitals reviewed.      ABO type: O POS    Imaging Studies:  Nuclear split function renal scan:   Left:  50%   Right: 50%  CT angiogram:   Left renal arteries: 1   Right renal arteries: 1   Other important findings: Very early left renal artery bifurcation      Assessment:     1. Willing to be kidney donor        Plan:   Transplant Surgery Donor Candidacy:   Ms. Martell remains a suitable candidate for kidney donation.    Based on the preoperative evaluation, a right robotic donor nephrectomy is planned.  Jose Yao MD

## 2018-10-30 NOTE — PROGRESS NOTES
Met with Kacie Martell in the clinic as part of her pre-transplant clearance appointment.    1) Performed a complete medication reconciliation.    2) Obtained copies of insurance cards, patient understood that the Pharm.D. will order medications, and that medications must be available prior to discharge   3) Discussed medication education that will occur post-transplant     Patient verbalized understanding and had the opportunity to ask questions.

## 2018-10-31 ENCOUNTER — TELEPHONE (OUTPATIENT)
Dept: TRANSPLANT | Facility: CLINIC | Age: 28
End: 2018-10-31

## 2018-10-31 NOTE — PROGRESS NOTES
DONOR PRE-OP NOTE    Potential Donor Name: Kacie Martell, [id number]  Encounter Date: 10/30/2018    Sex: female  YOB: 1990  Age: 28 y.o.    Housing/Contact Info:  [address]  [place] [address]  Telephone Information:   Mobile [phone]    Home: [phone] (home)  Work: There is no work phone number on file.  E-mail: [email or web address]    Potential Surgery Date: November 5, 2018  Potential Recipient Name: [facility] Jr, Clinic Number: [id number]  Potential Recipient Relationship: friend's     Patient presents as alert and oriented x 4, pleasant, good eye contact, well groomed, recall good, concentration/judgement good, average intelligence, calm, communicative, cooperative and asking and answering questions appropriately. Patient presents as a 28 y.o. year old female to donor pre-op appointment for scheduled kidney living donor surgery. Patient came alone to transplant appointment.  Patient states that she is independent with ADLs at this time.  Patient states continued motivation to pursue organ donation at this time.    Does patient drive?  Patient is aware will not be able to drive until medically cleared by the transplant team. Patient verbalizes understanding that patient will need assistance for all transportation needs until medically cleared to drive.    Caregivers/Transportation:  Damian Bee, boyfriend, [place], does drive/own car, works full time for [id number]. [phone]    Dependents/Others who rely on Patient/Caregiver for care: 3 yo son Antonio Patel to be cared for by ex  Jules    Cognitive:  Education: high school  Reading Level: 12th grade  Reports difficulty with: N/A  Denies difficulty with: reading, writing, seeing, hearing, comprehension, learning and memory Pt reports does wear glasses    Infusion Service: patient utilizing? no  Home Health: patient utilizing? no  DME:  patient utilizing? no    Living Will: no  Healthcare Power of :  "no  Written LW/HCPA and verbal information presented to patient today.    Insurance: See potential recipient's insurance for donor coverage.    Patient's Insurance: Does potential donor have their own health insurance? Yes [place] Medicaid -- reports needs to [place]; [place] referred [place] more [place]. Reports usually obtains out [facility] prescriptions from [facility] and reports can afford out patient medicines post organ donation surgery.  Possible concerns regarding insurance post-donation reviewed. Patient verbalizes clear understanding.    Financial:  Employment: Patient is currently employed. Patient does plan to return to work once medically cleared.  Pt reports works full time for [employer]. Pt reports supervisor knows about the surgery and will excuse time away from work due to transplant. Pt reports will not be paid for time missed due to transplant but has "put money aside" for transplant.  Spouse/Significant Other Employment: pt reports is not .   Patient states does not expect to have any financial problems following transplant surgery.  Patient states has not conducted fundraising to assist with donation costs.    Tobacco/Alcohol/Illicit Drug Abuse: Patient reports does not use tobacco products, alcohol, illicit drugs and non-prescription medications and that patient does plan to remain abstinent.     Psychiatric History: patient denies    Coping: Patient states that she is coping well at this time and states does not have any concerns, questions, or needs. Patient states will call as needed and does understand how to access resources including the  as needed, both inpatient and outpatient.    Resources, information and support provided. Psychosocial aspects regarding organ donation and transplantation were discussed. Patient reports having a clear understanding of resources, information, support and psychosocial aspects related to " organ donation.    Discharge Plan: Patient to discharge to own home under the care of patient's significant other Damian post-organ transplant. Patient states that patient's significant other Damian will be present as caregiver in the hospital. Patient's significant other Damian will transport patient home. Patient states agreement with not driving and not returning to work until medically cleared to do so.    Patient continues to report having a clear understanding of confidentiality, option to not donate, alternative treatment options, importance of compliance, resources and resource limitations, insurance and insurance insurable limitations, educational information, and the risks involved. Patient understands that the transplant may or may not work, the transplant date/donation date may be cancelled or postponed, and the psychosocial factors involved before, during and after donation.    Patient states having clear understanding and realistic expectations regarding the potential risks and potential benefits of organ transplantation and organ donation. Patient agrees to further discuss with health care team members and support system members, as well as to utilize available resources and express questions and/or concerns. Resources and information provided and reviewed.    Patient denies feelings of coercion, pressure or obligation to donate. Patient states that she is not receiving compensation for organ donation.    Patient reports motivation to pursue organ donation at this time.    Suitability for Donation: At this time, patient presents as a a suitable candidate for organ donation. Patient states does have a caregiver plan, transportation plan, and lodging plan in place. Patient states that patient does have medical and prescription medicine insurance in place and does have a plan in place to afford post-transplant costs.    Patient provided verbal permission to release any necessary information to outside  "resources for patient care and discharge planning.  Resources and information provided and reviewed.  Patient is choosing has no specific agency preferences.    Pharmacy Name: Reports usually obtains out pt prescriptions from [facility]     provided psychosocial support, counseling, resources, education, assistance, and discharge planning.  remains available.    Recommendations/Additional Comments:        Pt reports having read on a donor Facebook page of a person being discharged home after donation with some medical problems and that donor being re- hospitalized for "a long time". Pt reports does not want to be discharged too soon following donor surgery. Pt was referred to transplant medical team regarding her questions about medical discharge from hospital. Transplant  will notify transplant nurse coordinator and transplant hospital  of patient's voiced concerns.        Patient [place] has [place] -- reports needs to [place]; [place]. Reports usually obtains out pt prescriptions from [facility] and reports can afford out patient medicines post organ donation surgery.    Patient states is aware of [facility]'s affiliation and/or partnership with agencies in home health care, LTAC, SNF, DME, and other hospitals and clinics.    Radha Madden MSW LCSW    "

## 2018-10-31 NOTE — TELEPHONE ENCOUNTER
"Called patient to discuss any issues or concerns related to inpatient stay and discharge.  Informed patient that she would not be discharged before it is medically safe.  Encouraged patient to contact any team member regarding any concerns at any part of the donation process.  Pt stated that "it was not a big deal, I just wanted to make sure I would not be discharged before I was ready."  All questions answered and patient verbalized understanding.    ----- Message from Radha Madden sent at 10/31/2018 11:29 AM CDT -----  Dean Cain, I met with this patient yesterday for donor pre op clearance. Please see below of her concern. Radha     Pt reports having read on a donor facebook page of a person being discharged home after donation with some medical problems and that donor being re- hospitalized for "a long time". Pt reports does not want to be discharged too soon following donor surgery. Pt was referred to transplant medical team regarding her questions about medical discharge from hospital. Transplant  will notify transplant nurse coordinator and transplant hospital  of patient's voiced concerns.             "

## 2018-11-02 ENCOUNTER — TELEPHONE (OUTPATIENT)
Dept: TRANSPLANT | Facility: CLINIC | Age: 28
End: 2018-11-02

## 2018-11-02 ENCOUNTER — COMMITTEE REVIEW (OUTPATIENT)
Dept: TRANSPLANT | Facility: CLINIC | Age: 28
End: 2018-11-02

## 2018-11-02 NOTE — COMMITTEE REVIEW
Pt was discussed at committee on 11/2/18 regarding concerns with being discharged too early post surgery.  Coordinator discussed concerns with patient and Donor Advocate to contact patient to identify any needs.      Note written by Flavio Cooper RN.      I was present at the meeting and attest to the decision of the committee

## 2018-11-05 ENCOUNTER — HOSPITAL ENCOUNTER (INPATIENT)
Facility: HOSPITAL | Age: 28
LOS: 1 days | Discharge: HOME | DRG: 661 | End: 2018-11-06
Attending: TRANSPLANT SURGERY | Admitting: TRANSPLANT SURGERY
Payer: COMMERCIAL

## 2018-11-05 ENCOUNTER — ANESTHESIA (OUTPATIENT)
Dept: SURGERY | Facility: HOSPITAL | Age: 28
DRG: 661 | End: 2018-11-05
Payer: COMMERCIAL

## 2018-11-05 DIAGNOSIS — Z90.5 S/P NEPHRECTOMY: Primary | ICD-10-CM

## 2018-11-05 DIAGNOSIS — Z00.5 WILLING TO BE KIDNEY DONOR: ICD-10-CM

## 2018-11-05 DIAGNOSIS — Z00.5 TRANSPLANT DONOR EVALUATION: Primary | ICD-10-CM

## 2018-11-05 LAB
B-HCG UR QL: NEGATIVE
CTP QC/QA: YES
HCT VFR BLD AUTO: 30.1 %
HCT VFR BLD AUTO: 36.8 %

## 2018-11-05 PROCEDURE — 27000221 HC OXYGEN, UP TO 24 HOURS

## 2018-11-05 PROCEDURE — 63600175 PHARM REV CODE 636 W HCPCS: Mod: NTX | Performed by: TRANSPLANT SURGERY

## 2018-11-05 PROCEDURE — 25000003 PHARM REV CODE 250: Performed by: NURSE ANESTHETIST, CERTIFIED REGISTERED

## 2018-11-05 PROCEDURE — 37000009 HC ANESTHESIA EA ADD 15 MINS: Performed by: TRANSPLANT SURGERY

## 2018-11-05 PROCEDURE — 27100025 HC TUBING, SET FLUID WARMER: Mod: NTX | Performed by: NURSE ANESTHETIST, CERTIFIED REGISTERED

## 2018-11-05 PROCEDURE — 71000033 HC RECOVERY, INTIAL HOUR: Performed by: TRANSPLANT SURGERY

## 2018-11-05 PROCEDURE — 50547 LAPARO REMOVAL DONOR KIDNEY: CPT | Mod: RT,ICN,, | Performed by: TRANSPLANT SURGERY

## 2018-11-05 PROCEDURE — D9220A PRA ANESTHESIA: Mod: ANES,,, | Performed by: ANESTHESIOLOGY

## 2018-11-05 PROCEDURE — 0TT04ZZ RESECTION OF RIGHT KIDNEY, PERCUTANEOUS ENDOSCOPIC APPROACH: ICD-10-PCS | Performed by: TRANSPLANT SURGERY

## 2018-11-05 PROCEDURE — D9220A PRA ANESTHESIA: Mod: CRNA,,, | Performed by: NURSE ANESTHETIST, CERTIFIED REGISTERED

## 2018-11-05 PROCEDURE — 25000003 PHARM REV CODE 250: Performed by: TRANSPLANT SURGERY

## 2018-11-05 PROCEDURE — S0028 INJECTION, FAMOTIDINE, 20 MG: HCPCS | Performed by: NURSE ANESTHETIST, CERTIFIED REGISTERED

## 2018-11-05 PROCEDURE — 63600175 PHARM REV CODE 636 W HCPCS: Performed by: PEDIATRICS

## 2018-11-05 PROCEDURE — 63600175 PHARM REV CODE 636 W HCPCS: Performed by: NURSE ANESTHETIST, CERTIFIED REGISTERED

## 2018-11-05 PROCEDURE — 25000003 PHARM REV CODE 250: Mod: NTX | Performed by: ANESTHESIOLOGY

## 2018-11-05 PROCEDURE — 25000003 PHARM REV CODE 250: Performed by: NURSE PRACTITIONER

## 2018-11-05 PROCEDURE — 50547 LAPARO REMOVAL DONOR KIDNEY: CPT | Mod: 82,RT,ICN, | Performed by: TRANSPLANT SURGERY

## 2018-11-05 PROCEDURE — 36415 COLL VENOUS BLD VENIPUNCTURE: CPT

## 2018-11-05 PROCEDURE — S5010 5% DEXTROSE AND 0.45% SALINE: HCPCS | Performed by: PEDIATRICS

## 2018-11-05 PROCEDURE — 25000003 PHARM REV CODE 250: Performed by: PHYSICIAN ASSISTANT

## 2018-11-05 PROCEDURE — 94761 N-INVAS EAR/PLS OXIMETRY MLT: CPT

## 2018-11-05 PROCEDURE — 71000039 HC RECOVERY, EACH ADD'L HOUR: Performed by: TRANSPLANT SURGERY

## 2018-11-05 PROCEDURE — 37000008 HC ANESTHESIA 1ST 15 MINUTES: Performed by: TRANSPLANT SURGERY

## 2018-11-05 PROCEDURE — 25000003 PHARM REV CODE 250

## 2018-11-05 PROCEDURE — 27201423 OPTIME MED/SURG SUP & DEVICES STERILE SUPPLY: Performed by: TRANSPLANT SURGERY

## 2018-11-05 PROCEDURE — 20600001 HC STEP DOWN PRIVATE ROOM

## 2018-11-05 PROCEDURE — 25000003 PHARM REV CODE 250: Performed by: PEDIATRICS

## 2018-11-05 PROCEDURE — 36000712 HC OR TIME LEV V 1ST 15 MIN: Performed by: TRANSPLANT SURGERY

## 2018-11-05 PROCEDURE — 85014 HEMATOCRIT: CPT

## 2018-11-05 PROCEDURE — 36000713 HC OR TIME LEV V EA ADD 15 MIN: Performed by: TRANSPLANT SURGERY

## 2018-11-05 PROCEDURE — 8E0W4CZ ROBOTIC ASSISTED PROCEDURE OF TRUNK REGION, PERCUTANEOUS ENDOSCOPIC APPROACH: ICD-10-PCS | Performed by: TRANSPLANT SURGERY

## 2018-11-05 PROCEDURE — 81025 URINE PREGNANCY TEST: CPT | Mod: NTX | Performed by: TRANSPLANT SURGERY

## 2018-11-05 RX ORDER — DEXAMETHASONE SODIUM PHOSPHATE 4 MG/ML
INJECTION, SOLUTION INTRA-ARTICULAR; INTRALESIONAL; INTRAMUSCULAR; INTRAVENOUS; SOFT TISSUE
Status: DISCONTINUED | OUTPATIENT
Start: 2018-11-05 | End: 2018-11-05

## 2018-11-05 RX ORDER — HEPARIN SODIUM 5000 [USP'U]/ML
5000 INJECTION, SOLUTION INTRAVENOUS; SUBCUTANEOUS
Status: COMPLETED | OUTPATIENT
Start: 2018-11-05 | End: 2018-11-05

## 2018-11-05 RX ORDER — ONDANSETRON HYDROCHLORIDE 2 MG/ML
INJECTION, SOLUTION INTRAMUSCULAR; INTRAVENOUS
Status: DISCONTINUED | OUTPATIENT
Start: 2018-11-05 | End: 2018-11-05

## 2018-11-05 RX ORDER — FENTANYL CITRATE 50 UG/ML
INJECTION, SOLUTION INTRAMUSCULAR; INTRAVENOUS
Status: DISCONTINUED | OUTPATIENT
Start: 2018-11-05 | End: 2018-11-05

## 2018-11-05 RX ORDER — KETAMINE HCL IN 0.9 % NACL 50 MG/5 ML
SYRINGE (ML) INTRAVENOUS
Status: DISCONTINUED | OUTPATIENT
Start: 2018-11-05 | End: 2018-11-05

## 2018-11-05 RX ORDER — CEFAZOLIN SODIUM 1 G/3ML
2 INJECTION, POWDER, FOR SOLUTION INTRAMUSCULAR; INTRAVENOUS
Status: COMPLETED | OUTPATIENT
Start: 2018-11-05 | End: 2018-11-05

## 2018-11-05 RX ORDER — HEPARIN SODIUM 5000 [USP'U]/ML
5000 INJECTION, SOLUTION INTRAVENOUS; SUBCUTANEOUS EVERY 8 HOURS
Status: DISCONTINUED | OUTPATIENT
Start: 2018-11-05 | End: 2018-11-06 | Stop reason: HOSPADM

## 2018-11-05 RX ORDER — ONDANSETRON 2 MG/ML
4 INJECTION INTRAMUSCULAR; INTRAVENOUS EVERY 6 HOURS PRN
Status: DISCONTINUED | OUTPATIENT
Start: 2018-11-05 | End: 2018-11-06 | Stop reason: HOSPADM

## 2018-11-05 RX ORDER — ROCURONIUM BROMIDE 10 MG/ML
INJECTION, SOLUTION INTRAVENOUS
Status: DISCONTINUED | OUTPATIENT
Start: 2018-11-05 | End: 2018-11-05

## 2018-11-05 RX ORDER — ACETAMINOPHEN 325 MG/1
650 TABLET ORAL EVERY 6 HOURS PRN
Status: DISCONTINUED | OUTPATIENT
Start: 2018-11-05 | End: 2018-11-06 | Stop reason: HOSPADM

## 2018-11-05 RX ORDER — SODIUM CHLORIDE 9 MG/ML
INJECTION, SOLUTION INTRAVENOUS CONTINUOUS
Status: DISCONTINUED | OUTPATIENT
Start: 2018-11-05 | End: 2018-11-05

## 2018-11-05 RX ORDER — NEOSTIGMINE METHYLSULFATE 1 MG/ML
INJECTION, SOLUTION INTRAVENOUS
Status: DISCONTINUED | OUTPATIENT
Start: 2018-11-05 | End: 2018-11-05

## 2018-11-05 RX ORDER — SIMETHICONE 80 MG
1 TABLET,CHEWABLE ORAL 3 TIMES DAILY PRN
Status: DISCONTINUED | OUTPATIENT
Start: 2018-11-05 | End: 2018-11-06 | Stop reason: HOSPADM

## 2018-11-05 RX ORDER — OXYCODONE AND ACETAMINOPHEN 10; 325 MG/1; MG/1
1 TABLET ORAL EVERY 4 HOURS PRN
Status: DISCONTINUED | OUTPATIENT
Start: 2018-11-05 | End: 2018-11-06

## 2018-11-05 RX ORDER — GLYCOPYRROLATE 0.2 MG/ML
INJECTION INTRAMUSCULAR; INTRAVENOUS
Status: DISCONTINUED | OUTPATIENT
Start: 2018-11-05 | End: 2018-11-05

## 2018-11-05 RX ORDER — KETOROLAC TROMETHAMINE 30 MG/ML
15 INJECTION, SOLUTION INTRAMUSCULAR; INTRAVENOUS EVERY 6 HOURS
Status: DISCONTINUED | OUTPATIENT
Start: 2018-11-05 | End: 2018-11-06 | Stop reason: HOSPADM

## 2018-11-05 RX ORDER — BISACODYL 5 MG
10 TABLET, DELAYED RELEASE (ENTERIC COATED) ORAL DAILY
Status: DISCONTINUED | OUTPATIENT
Start: 2018-11-05 | End: 2018-11-06 | Stop reason: HOSPADM

## 2018-11-05 RX ORDER — LIDOCAINE HCL/PF 100 MG/5ML
SYRINGE (ML) INTRAVENOUS
Status: DISCONTINUED | OUTPATIENT
Start: 2018-11-05 | End: 2018-11-05

## 2018-11-05 RX ORDER — FAMOTIDINE 10 MG/ML
INJECTION INTRAVENOUS
Status: DISCONTINUED | OUTPATIENT
Start: 2018-11-05 | End: 2018-11-05

## 2018-11-05 RX ORDER — FENTANYL CITRATE 50 UG/ML
25 INJECTION, SOLUTION INTRAMUSCULAR; INTRAVENOUS EVERY 5 MIN PRN
Status: DISCONTINUED | OUTPATIENT
Start: 2018-11-05 | End: 2018-11-05 | Stop reason: HOSPADM

## 2018-11-05 RX ORDER — MEPERIDINE HYDROCHLORIDE 50 MG/ML
12.5 INJECTION INTRAMUSCULAR; INTRAVENOUS; SUBCUTANEOUS ONCE
Status: DISCONTINUED | OUTPATIENT
Start: 2018-11-05 | End: 2018-11-05 | Stop reason: HOSPADM

## 2018-11-05 RX ORDER — OXYCODONE AND ACETAMINOPHEN 5; 325 MG/1; MG/1
2 TABLET ORAL EVERY 4 HOURS PRN
Status: DISCONTINUED | OUTPATIENT
Start: 2018-11-05 | End: 2018-11-05

## 2018-11-05 RX ORDER — ACETAMINOPHEN 10 MG/ML
INJECTION, SOLUTION INTRAVENOUS
Status: DISCONTINUED | OUTPATIENT
Start: 2018-11-05 | End: 2018-11-05

## 2018-11-05 RX ORDER — OXYCODONE AND ACETAMINOPHEN 5; 325 MG/1; MG/1
TABLET ORAL
Status: COMPLETED
Start: 2018-11-05 | End: 2018-11-05

## 2018-11-05 RX ORDER — BUPIVACAINE HYDROCHLORIDE 2.5 MG/ML
INJECTION, SOLUTION EPIDURAL; INFILTRATION; INTRACAUDAL
Status: DISCONTINUED | OUTPATIENT
Start: 2018-11-05 | End: 2018-11-05 | Stop reason: HOSPADM

## 2018-11-05 RX ORDER — PROPOFOL 10 MG/ML
VIAL (ML) INTRAVENOUS
Status: DISCONTINUED | OUTPATIENT
Start: 2018-11-05 | End: 2018-11-05

## 2018-11-05 RX ORDER — ONDANSETRON 4 MG/1
4 TABLET, ORALLY DISINTEGRATING ORAL EVERY 6 HOURS PRN
Status: DISCONTINUED | OUTPATIENT
Start: 2018-11-05 | End: 2018-11-06 | Stop reason: HOSPADM

## 2018-11-05 RX ORDER — CEFAZOLIN SODIUM 1 G/3ML
2 INJECTION, POWDER, FOR SOLUTION INTRAMUSCULAR; INTRAVENOUS
Status: COMPLETED | OUTPATIENT
Start: 2018-11-05 | End: 2018-11-06

## 2018-11-05 RX ORDER — OXYCODONE AND ACETAMINOPHEN 5; 325 MG/1; MG/1
1 TABLET ORAL EVERY 4 HOURS PRN
Status: DISCONTINUED | OUTPATIENT
Start: 2018-11-05 | End: 2018-11-06 | Stop reason: HOSPADM

## 2018-11-05 RX ORDER — SCOLOPAMINE TRANSDERMAL SYSTEM 1 MG/1
1 PATCH, EXTENDED RELEASE TRANSDERMAL ONCE
Status: DISCONTINUED | OUTPATIENT
Start: 2018-11-05 | End: 2018-11-06 | Stop reason: HOSPADM

## 2018-11-05 RX ORDER — MIDAZOLAM HYDROCHLORIDE 5 MG/ML
INJECTION INTRAMUSCULAR; INTRAVENOUS
Status: DISCONTINUED | OUTPATIENT
Start: 2018-11-05 | End: 2018-11-05

## 2018-11-05 RX ORDER — EPHEDRINE SULFATE 50 MG/ML
INJECTION, SOLUTION INTRAVENOUS
Status: DISCONTINUED | OUTPATIENT
Start: 2018-11-05 | End: 2018-11-05

## 2018-11-05 RX ORDER — SODIUM CHLORIDE 0.9 % (FLUSH) 0.9 %
3 SYRINGE (ML) INJECTION
Status: DISCONTINUED | OUTPATIENT
Start: 2018-11-05 | End: 2018-11-06 | Stop reason: HOSPADM

## 2018-11-05 RX ORDER — DOCUSATE SODIUM 100 MG/1
100 CAPSULE, LIQUID FILLED ORAL 3 TIMES DAILY
Status: DISCONTINUED | OUTPATIENT
Start: 2018-11-05 | End: 2018-11-06 | Stop reason: HOSPADM

## 2018-11-05 RX ORDER — DEXTROSE MONOHYDRATE AND SODIUM CHLORIDE 5; .45 G/100ML; G/100ML
INJECTION, SOLUTION INTRAVENOUS CONTINUOUS
Status: DISCONTINUED | OUTPATIENT
Start: 2018-11-05 | End: 2018-11-06

## 2018-11-05 RX ORDER — FUROSEMIDE 10 MG/ML
INJECTION INTRAMUSCULAR; INTRAVENOUS
Status: DISCONTINUED | OUTPATIENT
Start: 2018-11-05 | End: 2018-11-05

## 2018-11-05 RX ADMIN — KETOROLAC TROMETHAMINE 15 MG: 30 INJECTION, SOLUTION INTRAMUSCULAR at 05:11

## 2018-11-05 RX ADMIN — SCOPALAMINE 1 PATCH: 1 PATCH, EXTENDED RELEASE TRANSDERMAL at 06:11

## 2018-11-05 RX ADMIN — ROCURONIUM BROMIDE 40 MG: 10 INJECTION, SOLUTION INTRAVENOUS at 07:11

## 2018-11-05 RX ADMIN — MIDAZOLAM 2 MG: 5 INJECTION INTRAMUSCULAR; INTRAVENOUS at 07:11

## 2018-11-05 RX ADMIN — ONDANSETRON 4 MG: 4 TABLET, ORALLY DISINTEGRATING ORAL at 08:11

## 2018-11-05 RX ADMIN — ROCURONIUM BROMIDE 10 MG: 10 INJECTION, SOLUTION INTRAVENOUS at 08:11

## 2018-11-05 RX ADMIN — ROCURONIUM BROMIDE 10 MG: 10 INJECTION, SOLUTION INTRAVENOUS at 09:11

## 2018-11-05 RX ADMIN — FUROSEMIDE 10 MG: 10 INJECTION, SOLUTION INTRAMUSCULAR; INTRAVENOUS at 09:11

## 2018-11-05 RX ADMIN — PROPOFOL 20 MG: 10 INJECTION, EMULSION INTRAVENOUS at 10:11

## 2018-11-05 RX ADMIN — FENTANYL CITRATE 25 MCG: 50 INJECTION, SOLUTION INTRAMUSCULAR; INTRAVENOUS at 08:11

## 2018-11-05 RX ADMIN — DOCUSATE SODIUM 100 MG: 100 CAPSULE, LIQUID FILLED ORAL at 08:11

## 2018-11-05 RX ADMIN — SIMETHICONE CHEW TAB 80 MG 80 MG: 80 TABLET ORAL at 04:11

## 2018-11-05 RX ADMIN — ONDANSETRON 4 MG: 2 INJECTION, SOLUTION INTRAMUSCULAR; INTRAVENOUS at 11:11

## 2018-11-05 RX ADMIN — LIDOCAINE HYDROCHLORIDE 75 MG: 20 INJECTION, SOLUTION INTRAVENOUS at 07:11

## 2018-11-05 RX ADMIN — CEFAZOLIN 2 G: 330 INJECTION, POWDER, FOR SOLUTION INTRAMUSCULAR; INTRAVENOUS at 04:11

## 2018-11-05 RX ADMIN — NEOSTIGMINE METHYLSULFATE 5 MG: 1 INJECTION INTRAVENOUS at 11:11

## 2018-11-05 RX ADMIN — FENTANYL CITRATE 50 MCG: 50 INJECTION, SOLUTION INTRAMUSCULAR; INTRAVENOUS at 07:11

## 2018-11-05 RX ADMIN — KETOROLAC TROMETHAMINE 15 MG: 30 INJECTION, SOLUTION INTRAMUSCULAR at 12:11

## 2018-11-05 RX ADMIN — BISACODYL 10 MG: 5 TABLET, COATED ORAL at 04:11

## 2018-11-05 RX ADMIN — PROPOFOL 150 MG: 10 INJECTION, EMULSION INTRAVENOUS at 07:11

## 2018-11-05 RX ADMIN — ONDANSETRON 4 MG: 4 TABLET, ORALLY DISINTEGRATING ORAL at 03:11

## 2018-11-05 RX ADMIN — DOCUSATE SODIUM 100 MG: 100 CAPSULE, LIQUID FILLED ORAL at 04:11

## 2018-11-05 RX ADMIN — Medication 10 MG: at 09:11

## 2018-11-05 RX ADMIN — DEXTROSE MONOHYDRATE AND SODIUM CHLORIDE: 5; .45 INJECTION, SOLUTION INTRAVENOUS at 11:11

## 2018-11-05 RX ADMIN — GLYCOPYRROLATE 0.6 MG: 0.2 INJECTION, SOLUTION INTRAMUSCULAR; INTRAVENOUS at 11:11

## 2018-11-05 RX ADMIN — FAMOTIDINE 20 MG: 10 INJECTION, SOLUTION INTRAVENOUS at 07:11

## 2018-11-05 RX ADMIN — EPHEDRINE SULFATE 10 MG: 50 INJECTION, SOLUTION INTRAMUSCULAR; INTRAVENOUS; SUBCUTANEOUS at 10:11

## 2018-11-05 RX ADMIN — Medication 10 MG: at 10:11

## 2018-11-05 RX ADMIN — SODIUM CHLORIDE: 0.9 INJECTION, SOLUTION INTRAVENOUS at 05:11

## 2018-11-05 RX ADMIN — SODIUM CHLORIDE, SODIUM GLUCONATE, SODIUM ACETATE, POTASSIUM CHLORIDE, MAGNESIUM CHLORIDE, SODIUM PHOSPHATE, DIBASIC, AND POTASSIUM PHOSPHATE: .53; .5; .37; .037; .03; .012; .00082 INJECTION, SOLUTION INTRAVENOUS at 08:11

## 2018-11-05 RX ADMIN — PROPOFOL 30 MG: 10 INJECTION, EMULSION INTRAVENOUS at 10:11

## 2018-11-05 RX ADMIN — Medication 20 MG: at 08:11

## 2018-11-05 RX ADMIN — GLYCOPYRROLATE 0.2 MG: 0.2 INJECTION, SOLUTION INTRAMUSCULAR; INTRAVENOUS at 08:11

## 2018-11-05 RX ADMIN — OXYCODONE HYDROCHLORIDE AND ACETAMINOPHEN 1 TABLET: 5; 325 TABLET ORAL at 11:11

## 2018-11-05 RX ADMIN — ACETAMINOPHEN 650 MG: 325 TABLET, FILM COATED ORAL at 08:11

## 2018-11-05 RX ADMIN — CEFAZOLIN 2 G: 330 INJECTION, POWDER, FOR SOLUTION INTRAMUSCULAR; INTRAVENOUS at 07:11

## 2018-11-05 RX ADMIN — SODIUM CHLORIDE, SODIUM GLUCONATE, SODIUM ACETATE, POTASSIUM CHLORIDE, MAGNESIUM CHLORIDE, SODIUM PHOSPHATE, DIBASIC, AND POTASSIUM PHOSPHATE: .53; .5; .37; .037; .03; .012; .00082 INJECTION, SOLUTION INTRAVENOUS at 10:11

## 2018-11-05 RX ADMIN — DEXTROSE MONOHYDRATE AND SODIUM CHLORIDE: 5; .45 INJECTION, SOLUTION INTRAVENOUS at 06:11

## 2018-11-05 RX ADMIN — OXYCODONE AND ACETAMINOPHEN 1 TABLET: 5; 325 TABLET ORAL at 11:11

## 2018-11-05 RX ADMIN — DEXAMETHASONE SODIUM PHOSPHATE 4 MG: 4 INJECTION, SOLUTION INTRAMUSCULAR; INTRAVENOUS at 07:11

## 2018-11-05 RX ADMIN — ACETAMINOPHEN 1000 MG: 10 INJECTION, SOLUTION INTRAVENOUS at 07:11

## 2018-11-05 RX ADMIN — HEPARIN SODIUM 5000 UNITS: 5000 INJECTION, SOLUTION INTRAVENOUS; SUBCUTANEOUS at 05:11

## 2018-11-05 NOTE — OP NOTE
Operative Report    Date of Procedure: 11/05/2018    Surgeons:  Surgeon(s) and Role:     * Jose Yao MD - Primary     * Tavares Braswell Jr., MD - Assisting     * Pancho Almendarez MD - Fellow    First Assistant Attestation:  The presence of an additional attending surgeon functioning as first assistant was required due to the complexity of the procedure relative to any available residents. I certify that no resident was available who was qualified to serve as first assistant. Duties performed by the assistant included assisting the primary surgeon.    Pre-operative Diagnosis: Living Kidney Donor  Post-operative Diagnosis: Same  Procedure(s) Performed:   Right Kidney  robotic donor nephrectomy    Anesthesia: General endotracheal    Preamble  Indications and Patient Counseling: The patient is a 28 y.o. year-old female who has been evaluated as a living kidney donor.  A right  robotic nephrectomy is planned.  The patient has been thoroughly informed regarding the risks of the procedure, including death, serious surgical complications, bleeding, and reoperation.  She has also been informed of the possible need for conversion to open surgery.  She is aware that kidney donation is completely voluntary, and denies any coercion or motive for donating other than a sincere desire to benefit the recipient.  The patient voices understanding and agrees to proceed with the planned procedure.    ABO Confirmation: Prior to proceeding with donor nephrectomy, a formal ABO confirmation was done according to hospital and UNOS policies.  I confirmed the UNOS ID number of the donor organ and the donor and recipient ABO types.  This confirmation was personally done by an attending surgeon and circulating nurse, and is officially documented elsewhere.    Time-Out: A complete time out was carried out prior to incision, with confirmation of patient identity, correct procedure, correct operative site, appropriate antibiotic  prophylaxis, review of any known allergies, and presence of all needed equipment.    Procedure in Detail  Following the induction of general endotracheal anesthesia, the patient was positioned in a left lateral decubitus position with the table flexed.  The abdomen and left flank were sterilely prepped and draped.  A balloon-tipped 12 mm laparoscopic port was introduced just below the umbilicus using an open Karthik technique.  The abdomen was then insufflated to 15 mmHg pressure.  Three additional ports were then placed consisting of a 12 mm port to the right of the umbilicus, an 8 mm da Eliceo port just below the right costal margin near the mid axillary line and another 8 mm da Eliceo port just medial to the anterior superior iliac spine.  The da Eliceo robotic system was then docked to these ports.  Dissection was then carried out using the da Eliceo system.  The colon was mobilized down to the pelvic brim.  Gerota's fascia was then opened and the right renal vein was identified. The right renal artery was identified as proximally as feasible.  The adrenal gland was dissected away from the superior pole of the kidney.  The ureter was swept upwards off of the psoas muscle with care taken to avoid traumatizing it.  The posterior and lateral attachments of the kidney were then taken down.  A 8 cms  Pfannenstiel  incision was created for extraction of the kidney.  The Gelport device was placed at this point.  The abdomen was re-insufflated, and the kidney was inspected with conventional laparoscopy to ensure that all non-vascular attachments hand been taken down.  Additional cautery dissection was done as needed.  The patient was given 10 mg of Lasix to ensure a brisk diuresis.  The ureter was then divided distally using a vascular Endo-SALLY stapler.  After this was done, the  renal artery was divided with a vascular stapler followed by the renal vein.  The kidney was then removed through the Gelport and immediately  placed on ice and flushed with ice cold UW solution. Attention was then directed to the operative field.  The operative field was thoroughly irrigated and assessed for hemostasis. The port sites were assessed for size of the fascial defect, and external suturing or a specifically designed closure product was used.  The kidney extraction incision was closed: peritoneum 3-0 Vicryl, fascia continuous #1 PDS.  All incisions were infiltrated with bupivicaine. Skin incisions were closed with 4-0 subcuticular Monocryl.  The patient was then taken from the Operating Room in satisfactory condition.  Prior to the conclusion of the procedure, I was told that all sponge, needle and instrument counts were correct.      Confirmation of Intended Recipient: Prior to the kidney leaving the donor operating room, the correct intended recipient was verified by the attending surgeon and the circulating nurse.      Estimated Blood Loss: 1.69 fl. oz. mL  Drains: None  Specimens: none    Findings:  Healthy-appearing kidney  Arterial anatomy: Single  Venous anatomy: Single  Ureteral anatomy: Single      Times:  Console start:  11/5/2018  8:33 AM  Console finish: 11/5/2018  9:42 AM  Artery divided:  11/5/2018 10:04 AM  Kidney on ice:  11/5/2018 10:07 AM  Flush begin:  11/5/2018 10:08 AM  Flush end:  11/5/2018 10:10 AM  Kidney out of room: 11/5/2018 10:24 AM

## 2018-11-05 NOTE — PROGRESS NOTES
DONOR NEPHRECTOMY NOTE:    Kacie GISELA Farrarer is s/p donor nephrectomy on 11/5/18.  This patients medications prior to surgery were reviewed and restarted, as appropriate.

## 2018-11-05 NOTE — PROGRESS NOTES
Patient arrived to the unit in stable condition from PACU. Patient is AAOx3. Patient c/o nausea. No orders for anti-nausea meds at this time. Mecca Cobb NP notified and new orders for Zofran noted. Irby catheter CDI and patient is receiving IVF. 5 surgical sites CDI with derma bond in place. Patient placed on the Visi monitor. Patient removed her Edwar hose and SCDs upon arrival and does not want to wear them at this time. Patient oriented to the unit and her room. Reminded the patient and her family to call for assistance. Call light and personal items are within reach.

## 2018-11-05 NOTE — INTERVAL H&P NOTE
The patient has been examined and the H&P has been reviewed:    I concur with the findings and no changes have occurred since H&P was written. Right robotic assisted nephrectomy, single artery, vein and ureter on the imaging.    Anesthesia/Surgery risks, benefits and alternative options discussed and understood by patient/family.          Active Hospital Problems    Diagnosis  POA    Willing to be kidney donor [Z00.5]  Not Applicable      Resolved Hospital Problems   No resolved problems to display.

## 2018-11-05 NOTE — PROGRESS NOTES
Patient admitted for Kidney transplant donor nephrectomy.Transplant coordinator met with the patient on rounds to introduce self and explain the coordinator role.Transplant Coordinator explained will follow the patient while in the hospital and assist with discharge.

## 2018-11-05 NOTE — PLAN OF CARE
Plan of care discussed with patient. All questions/concerns addressed and patient verbalizes their understanding. Patient reports surgical pain is tolerable and denies nausea. VSS. No other concerns/needs at this time. Family has been updated. Will continue to monitor until time of transfer.

## 2018-11-05 NOTE — OP NOTE
Certification of Assistant at Surgery       Surgery Date: 11/5/2018     Participating Surgeons:  Surgeon(s) and Role:     * Jose Yao MD - Primary     * Tavares Braswell Jr., MD - Assisting     * Pancho Almendarez MD - Fellow    Procedures:  Procedure(s) (LRB):  ROBOTIC NEPHRECTOMY, DONOR (Right)    Assistant Surgeon's Certification of Necessity:  I understand that section 1842 (b) (6) (d) of the Social Security Act generally prohibits Medicare Part B reasonable charge payment for the services of assistants at surgery in teaching hospitals when qualified residents are available to furnish such services. I certify that the services for which payment is claimed were medically necessary, and that no qualified resident was available to perform the services. I further understand that these services are subject to post-payment review by the Medicare carrier.      Tavares Braswell Jr, MD    11/05/2018  10:35 AM

## 2018-11-05 NOTE — TRANSFER OF CARE
"Anesthesia Transfer of Care Note    Patient: Kacie Martell    Procedure(s) Performed: Procedure(s) (LRB):  ROBOTIC NEPHRECTOMY, DONOR (Right)    Patient location: PACU    Anesthesia Type: general    Transport from OR: Transported from OR on 6-10 L/min O2 by face mask with adequate spontaneous ventilation    Post pain: adequate analgesia    Post assessment: no apparent anesthetic complications and tolerated procedure well    Post vital signs: stable    Level of consciousness: sedated    Nausea/Vomiting: no nausea/vomiting    Complications: none    Transfer of care protocol was followed      Last vitals:   Visit Vitals  /60 (BP Location: Right arm, Patient Position: Lying)   Pulse 75   Temp 36.6 °C (97.9 °F) (Temporal)   Resp 20   Ht 5' 6" (1.676 m)   Wt 74.8 kg (165 lb)   LMP 06/06/2018 (Exact Date)   SpO2 100%   Breastfeeding? No   BMI 26.63 kg/m²     "

## 2018-11-06 ENCOUNTER — PATIENT MESSAGE (OUTPATIENT)
Dept: TRANSPLANT | Facility: CLINIC | Age: 28
End: 2018-11-06

## 2018-11-06 VITALS
DIASTOLIC BLOOD PRESSURE: 61 MMHG | WEIGHT: 165 LBS | RESPIRATION RATE: 19 BRPM | HEIGHT: 66 IN | BODY MASS INDEX: 26.52 KG/M2 | HEART RATE: 73 BPM | SYSTOLIC BLOOD PRESSURE: 105 MMHG | OXYGEN SATURATION: 96 % | TEMPERATURE: 99 F

## 2018-11-06 LAB
ALBUMIN SERPL BCP-MCNC: 3.2 G/DL
ALP SERPL-CCNC: 54 U/L
ALT SERPL W/O P-5'-P-CCNC: 41 U/L
ANION GAP SERPL CALC-SCNC: 7 MMOL/L
AST SERPL-CCNC: 43 U/L
BASOPHILS # BLD AUTO: 0.02 K/UL
BASOPHILS NFR BLD: 0.2 %
BILIRUB SERPL-MCNC: 0.5 MG/DL
BUN SERPL-MCNC: 9 MG/DL
CALCIUM SERPL-MCNC: 8.9 MG/DL
CHLORIDE SERPL-SCNC: 108 MMOL/L
CO2 SERPL-SCNC: 24 MMOL/L
CREAT SERPL-MCNC: 1.2 MG/DL
DIFFERENTIAL METHOD: ABNORMAL
EOSINOPHIL # BLD AUTO: 0 K/UL
EOSINOPHIL NFR BLD: 0.1 %
ERYTHROCYTE [DISTWIDTH] IN BLOOD BY AUTOMATED COUNT: 12.6 %
EST. GFR  (AFRICAN AMERICAN): >60 ML/MIN/1.73 M^2
EST. GFR  (NON AFRICAN AMERICAN): >60 ML/MIN/1.73 M^2
GLUCOSE SERPL-MCNC: 88 MG/DL
HCT VFR BLD AUTO: 30.8 %
HCT VFR BLD AUTO: 32.3 %
HGB BLD-MCNC: 10.7 G/DL
IMM GRANULOCYTES # BLD AUTO: 0.03 K/UL
IMM GRANULOCYTES NFR BLD AUTO: 0.3 %
LYMPHOCYTES # BLD AUTO: 3.3 K/UL
LYMPHOCYTES NFR BLD: 31.3 %
MCH RBC QN AUTO: 29.7 PG
MCHC RBC AUTO-ENTMCNC: 33.1 G/DL
MCV RBC AUTO: 90 FL
MONOCYTES # BLD AUTO: 0.7 K/UL
MONOCYTES NFR BLD: 6.9 %
NEUTROPHILS # BLD AUTO: 6.5 K/UL
NEUTROPHILS NFR BLD: 61.2 %
NRBC BLD-RTO: 0 /100 WBC
PLATELET # BLD AUTO: 241 K/UL
PMV BLD AUTO: 10.1 FL
POTASSIUM SERPL-SCNC: 3.6 MMOL/L
PROT SERPL-MCNC: 5.8 G/DL
RBC # BLD AUTO: 3.6 M/UL
SODIUM SERPL-SCNC: 139 MMOL/L
WBC # BLD AUTO: 10.56 K/UL

## 2018-11-06 PROCEDURE — 25000003 PHARM REV CODE 250: Performed by: NURSE PRACTITIONER

## 2018-11-06 PROCEDURE — 99900035 HC TECH TIME PER 15 MIN (STAT)

## 2018-11-06 PROCEDURE — 25000003 PHARM REV CODE 250: Performed by: PHYSICIAN ASSISTANT

## 2018-11-06 PROCEDURE — 85014 HEMATOCRIT: CPT

## 2018-11-06 PROCEDURE — 63600175 PHARM REV CODE 636 W HCPCS: Performed by: PEDIATRICS

## 2018-11-06 PROCEDURE — 85025 COMPLETE CBC W/AUTO DIFF WBC: CPT

## 2018-11-06 PROCEDURE — 36415 COLL VENOUS BLD VENIPUNCTURE: CPT

## 2018-11-06 PROCEDURE — 80053 COMPREHEN METABOLIC PANEL: CPT

## 2018-11-06 RX ORDER — OXYCODONE AND ACETAMINOPHEN 10; 325 MG/1; MG/1
1 TABLET ORAL EVERY 4 HOURS PRN
Status: CANCELLED | OUTPATIENT
Start: 2018-11-06

## 2018-11-06 RX ORDER — BISACODYL 5 MG
10 TABLET, DELAYED RELEASE (ENTERIC COATED) ORAL DAILY PRN
Refills: 0 | COMMUNITY
Start: 2018-11-06

## 2018-11-06 RX ORDER — OXYCODONE AND ACETAMINOPHEN 10; 325 MG/1; MG/1
.5-1 TABLET ORAL EVERY 4 HOURS PRN
Qty: 40 TABLET | Refills: 0 | Status: SHIPPED | OUTPATIENT
Start: 2018-11-06

## 2018-11-06 RX ORDER — DOCUSATE SODIUM 100 MG/1
100 CAPSULE, LIQUID FILLED ORAL 3 TIMES DAILY PRN
Refills: 0 | COMMUNITY
Start: 2018-11-06

## 2018-11-06 RX ORDER — ACETAMINOPHEN 325 MG/1
650 TABLET ORAL EVERY 6 HOURS PRN
Refills: 0 | COMMUNITY
Start: 2018-11-06

## 2018-11-06 RX ADMIN — CEFAZOLIN 2 G: 330 INJECTION, POWDER, FOR SOLUTION INTRAMUSCULAR; INTRAVENOUS at 08:11

## 2018-11-06 RX ADMIN — KETOROLAC TROMETHAMINE: 30 INJECTION, SOLUTION INTRAMUSCULAR at 12:11

## 2018-11-06 RX ADMIN — BISACODYL 10 MG: 5 TABLET, COATED ORAL at 08:11

## 2018-11-06 RX ADMIN — ACETAMINOPHEN 650 MG: 325 TABLET, FILM COATED ORAL at 08:11

## 2018-11-06 RX ADMIN — KETOROLAC TROMETHAMINE 15 MG: 30 INJECTION, SOLUTION INTRAMUSCULAR at 12:11

## 2018-11-06 RX ADMIN — SIMETHICONE CHEW TAB 80 MG 80 MG: 80 TABLET ORAL at 08:11

## 2018-11-06 RX ADMIN — DOCUSATE SODIUM 100 MG: 100 CAPSULE, LIQUID FILLED ORAL at 02:11

## 2018-11-06 RX ADMIN — DOCUSATE SODIUM 100 MG: 100 CAPSULE, LIQUID FILLED ORAL at 08:11

## 2018-11-06 RX ADMIN — CEFAZOLIN 2 G: 330 INJECTION, POWDER, FOR SOLUTION INTRAMUSCULAR; INTRAVENOUS at 12:11

## 2018-11-06 RX ADMIN — KETOROLAC TROMETHAMINE 15 MG: 30 INJECTION, SOLUTION INTRAMUSCULAR at 06:11

## 2018-11-06 RX ADMIN — ACETAMINOPHEN 650 MG: 325 TABLET, FILM COATED ORAL at 02:11

## 2018-11-06 NOTE — PROGRESS NOTES
Discontinued berger per standing order. Advised pt to notify nurse with next urination and provided hat. Pt voiced understanding.

## 2018-11-06 NOTE — PROGRESS NOTES
DONOR NEPHRECTOMY EDUCATION & DISCHARGE NOTE:    Discharge medications are to include pain control as Percocet  mg (#40) and Colace/Dulcolax while taking pain medications to prevent constipation. Patient was counseled at discharged regarding the side effects of pain medication, including drowsiness, constipation, nausea and counseled not to operate a car while taking pain medication or take Tylenol (acetaminophen) containing medications while taking pain medication.  Patient verbalized understanding.

## 2018-11-06 NOTE — PLAN OF CARE
Problem: Patient Care Overview  Goal: Plan of Care Review  Outcome: Ongoing (interventions implemented as appropriate)  Recommendation/Intervention:   1. Continue diet order as tolerated   2. Encourage good po intake   3. If po intake <50%, recommend ONS.   4. Dietitian Following    Goals: Patient to meet >85% EEN/EPN  Nutrition Goal Status: progressing towards goal    Nutrition Discharge Planning: Provided pt with post surgery/nephrectomy recommendations. General healthy diet encouraged. Increased protein and fluid consumption recommended. No other needs identified.     Full assessment completed, see Dietitian Note 11/6/2018.

## 2018-11-06 NOTE — PROGRESS NOTES
Discharge Note:    Plan to discharge today.  Will RTC on 11/16/18 to see surgeon and coordinator with follow up labs.  Supplies and written instructions provided to patient.  All questions answered and patient verbalized understanding.

## 2018-11-06 NOTE — PROGRESS NOTES
Admit Note/Discharge Note:     Met with patient and significant other to assess needs. Patient is a 28 y.o.  female, admitted for for living kidney donation.      Patient admitted from home on 11/5/2018 .  At this time, patient presents as alert and oriented x 4, pleasant, good eye contact, well groomed, recall good, concentration/judgement good, average intelligence, calm, communicative, cooperative and asking and answering questions appropriately.  At this time, patients caregiver presents as alert and oriented x 4, pleasant, good eye contact, well groomed, recall good, concentration/judgement good, average intelligence, calm, communicative, cooperative and asking and answering questions appropriately.    Household/Family Systems     Patient resides with patient's significant other and son, at [address] [place] [address].  Support system includes significant other and parents.  Patient does have dependents that are in need of being cared for. Patient's son are being cared for by pt's ex-.     Patients primary caregiver is Damian Bee, patients significant other, phone number [phone].  Confirmed patients contact information is [phone] (home);   Telephone Information:   Mobile [phone]   .    During admission, patient's caregiver plans to stay in patient's room.  Confirmed patient and patients caregivers do have access to reliable transportation.    Cognitive Status/Learning     Patient reports reading ability as 12th grade and states patient does have difficulty with seeing and wears glasses.  Patient reports patient learns best by a combination of verbal, written, and tactile instructions.   Needed: No.   Highest education level: High School (9-12) or GED    Vocation/Disability   .  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    Adherence     Patient reports a high level of adherence  to patients health care regimen.  Adherence counseling and education provided. Patient verbalizes understanding.    Substance Use    Patient reports the following substance usage.    Tobacco: none, patient denies any use.  Alcohol: none, patient denies any use.  Illicit Drugs/Non-prescribed Medications: none, patient denies any use.  Patient states clear understanding of the potential impact of substance use.  Substance abstinence/cessation counseling, education and resources provided and reviewed.     Services Utilizing/ADLS    Infusion Service: Prior to admission, patient utilizing? no  Home Health: Prior to admission, patient utilizing? no  DME: Prior to admission, no  Pulmonary/Cardiac Rehab: Prior to admission, no  Dialysis:  Prior to admission, no  Transplant Specialty Pharmacy:  Prior to admission, no.    Prior to admission, patient reports patient was independent with ADLS and was driving.  Patient reports patient is not able to care for self at this time due to compromised medical condition (as documented in medical record) and physical weakness..  Patient indicates a willingness to care for self once medically cleared to do so.    Insurance/Medications    Insured by   Payor/Plan Subscr  Sex Relation Sub. Ins. ID Effective Group Num   1. MAUDE MILLAN E * 1959 Male  [id number]                                    [address] [address]   2. MAUDE MILLAN 1959 Male Self [id number]                                    [address] [address]      Primary Insurance (for UNOS reporting): Private Insurance  Secondary Insurance (for UNOS reporting): None    Patient reports patient is able to obtain and afford medications at this time and at time of discharge.    Living Will/Healthcare Power of     Patient states patient does not have a LW and/or HCPA.   provided education regarding LW and HCPA and the completion of forms.    Coping/Mental  Health    Patient is coping adequately with the aid of  family members. Patient denies mental health difficulties.     Discharge Planning    At time of discharge, patient plans to return to patient's home under the care of Damian Bee.  Patients significant other will transport patient.  Per rounds today, expected discharge date is 1/06/18. Patient and patients caregiver  verbalize understanding and are involved in treatment planning and discharge process.    Additional Concerns    Patient and Caregiver deny additional needs and/or concerns at this time. Patient is being followed for needs, education, resources, information, emotional support, supportive counseling, and for supportive and skilled discharge plan of care.  Patient and Caregiver verbalizes understanding and agreement with information reviewed, social work availability, and how to access available resources as needed.  remains available.      Discharge Note:    Pt will discharge to patient's home under the care of Damian Bee, patient's significant other, phone number [phone] with no DME/HH/infusion needs. Pt aware of, involved in, and coping well with this discharge plan. Pt did not have any concerns with the discharge plan at this time. SW remains available at [phone].

## 2018-11-06 NOTE — CONSULTS
" [facility]  Adult Nutrition  Consult Note     SUMMARY       Recommendations  Recommendation/Intervention:   1. Continue diet order as tolerated   2. Encourage good po intake   3. If po intake <50%, recommend ONS.   4. 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 Tolerating regular diet. Stated she felt past diet order of clear liquids made her feel nauseous, caregiver provided outside food which she felt made her feel better. Denies N/V. Provided pt with post surgery/nephrectomy recommendations. Patient appears nourished, states no recent wt changes.     Nutrition Discharge Planning: Provided pt with post surgery/nephrectomy recommendations. General healthy diet encouraged. Increased protein and fluid consumption recommended. No other needs identified.     Reason for Assessment  Reason for Assessment: consult  Diagnosis: transplant/postoperative complications(Donor Nephrectomy 11/5/18)  Relevant Medical History: No relevant medial history  Interdisciplinary Rounds: did not attend    Nutrition Risk Screen  Nutrition Risk Screen: no indicators present    Nutrition/Diet History  Patient Reported Diet/Restrictions/Preferences: general  Food Preferences: none  Do you have any cultural, spiritual, Sikhism conflicts, given your current situation?: none  Food Allergies: NKFA  Factors Affecting Nutritional Intake: None identified at this time    Anthropometrics  Temp: 99.2 °F (37.3 °C)  Height Method: Stated  Height: 5' 6" (167.6 cm)  Height (inches): 66 in  Weight Method: Stated  Weight: 74.8 kg (165 lb)  Weight (lb): 165 lb  Ideal Body Weight (IBW), Female: 130 lb  % Ideal Body Weight, Female (lb): 126.92 lb  BMI (Calculated): 26.7  BMI Grade: 25 - 29.9 - overweight     Lab/Procedures/Meds  Labs: Reviewed    11/06/2018    K 3.6 " 11/06/2018    BUN 9 11/06/2018    CREATININE 1.2 11/06/2018    CALCIUM 8.9 11/06/2018    PHOS 3.5 08/27/2018    ALBUMIN 3.2 (L) 11/06/2018     Meds: Reviewed  Scheduled Meds:   bisacodyl  10 mg Oral Daily    docusate sodium  100 mg Oral TID    heparin (porcine)  5,000 Units Subcutaneous Q8H    ketorolac  15 mg Intravenous Q6H    scopolamine  1 patch Transdermal Once     Physical Findings/Assessment  Overall Physical Appearance: nourished  Oral/Mouth Cavity: WDL  Skin: incision(s)    Estimated/Assessed Needs  Weight Used For Calorie Calculations: 74.8 kg (164 lb 14.5 oz)  Energy Calorie Requirements (kcal): 7821-8789  Energy Need Method: Kcal/kg(25-30 kcal/kg)  Protein Requirements: 74(1.0 gm/kg)  Weight Used For Protein Calculations: 74.8 kg (164 lb 14.5 oz)  Fluid Requirements (mL): 1 mL/kcal or per MD  Fluid Need Method: RDA Method(1mL/kcal or per MD)  RDA Method (mL): 1870     Nutrition Prescription Ordered  Current Diet Order: Regular    Evaluation of Received Nutrient/Fluid Intake in last 24h  I/O: +2.1L since admit  Comments: No BM recorded  % Intake of Estimated Energy Needs: 50 - 75 %  % Meal Intake: 50 - 75 %    Nutrition Risk  Level of Risk/Frequency of Follow-up: low(1x week)     Assessment and Plan  Nutrition Problem  Increased nutrient needs     Related to (etiology):   Physiological causes related to healing     Signs and Symptoms (as evidenced by):   S/p Donor Nephrectomy, 11/5/18     Nutrition Diagnosis Status:   New    Monitor and Evaluation  Food and Nutrient Intake: energy intake, food and beverage intake  Food and Nutrient Adminstration: diet order  Biochemical Data, Medical Tests and Procedures: lipid profile, inflammatory profile, glucose/endocrine profile, gastrointestinal profile, electrolyte and renal panel  Nutrition-Focused Physical Findings: overall appearance     Nutrition Follow-Up  RD Follow-up?: Yes

## 2018-11-06 NOTE — PROGRESS NOTES
Pt resting in bed with family at bedside.  Reports pain being controlled and voiding without difficulty.  Tolerating po intake.  Below discharge instructions reviewed with patient and caregiver. All questions answered and patient verbalized understanding.    KIDNEY DONOR DISCHARGE INSTRUCTIONS    1. No heavy lifting (greater than 10-15 pounds) for six weeks.  2. Showers only, for the first two weeks after surgery.   You can take a tub bath after two weeks or when your incision is completely healed.     3. Clean the incision in the shower with a mild soap and water, rinse and dry.   4.   Call the outpatient kidney transplant coordinator Monday through Friday 8:00 a.m. - 4:30 p.m. at  [phone] or [phone], ext. [phone] if calling long distance.  After hours, on weekends  and holidays call [phone] or [phone] and you will be directed to [facility] RN On Call  [facility] for the following:    Signs and symptoms of wound infection  · Redness and/or swelling of the wound  · Drainage from the wound  · Temperature > 101.0 F   · Wound opening or separation     Signs and symptoms of urinary tract infection  · Frequency of urination or problems urinating  · Burning during urination  · Cloudy or bloody urine  · Foul smelling urine  · Temperature > 101.0  Any other medical problems in the immediate discharge period which are of concern to you.    4. You will need to see the transplant doctor approximately four weeks after discharge.  Blood and urine specimens will be obtained two hours prior to your appointment.  If you did not receive the appointments upon discharge you can schedule your appointments by calling [phone] or [phone], ext. [phone].   5. Discuss with the doctor at your clinic appointment when you can return to driving and work.  If all goes well, usually this is within 4 to 6 weeks.    6. Six months after donating your kidney, you will again need lab work and a checkup with your doctor. As  well as on a yearly basis.  Our office will need copies of these records for the first 2 years after donation.  Please have your doctor fax them to us at [phone].              *YOU SHOULD ALWAYS HAVE YEARLY MEDICAL CHECKUPS WITH YOUR LOCAL PHYSICIAN, INCLUDING BLOOD WORK TO EVALUATE YOUR KIDNEY FUNCTION.   *Avoid Advil. Motrin, Aleive and other Non-Steriodals, these can be toxic to your kidney.    *Tylenol is okay.              Dear Kidney Donor,    Thank you so much for your heroic gift.  One never really knows, if they will be called to be a hero and whether or not they will be able to. You have accomplished this in our eyes, as well as, your recipients.  This is truly an amazing gift that you have given.    At discharge, you will be given a follow up appointment for lab work and a surgical visit which will occur about 4 weeks after donation.  At this appointment you will discuss with the physician when you will be able to return to normal activities, including work, as well as any other concerns you may have.      In order to ensure your health after donation, as well the health of future donors, we will need to do some routine follow up.  This will entail a call from our transplant office at   6 months, 1 year and 2 years after donation.   We are required to send information to [employer] ([employer]) on your progress and health at these intervals.    We will inquire about the followin. Current weight  2. Diagnostic tests done since our last call (CT scan, MRI, Ultrasound)  3. Lab work results including a creatinine and urinalysis  4. Blood Pressure reading  5. Any new medical conditions such as kidney problems, diabetes or hypertension  6. Admission to the hospital, if so, for what reason     It is very important that after donation you establish with a Primary Care Physician to maintain your health.  We ask that you see your physician at 6 months after donation, and then yearly.   These visits should include a complete physical exam, as well as blood work, including complete chemistries and urinalysis.  Please have your physician fax these lab results and clinic notes to us at [phone].    If there is anything we can help you with please call us at [phone].  We sincerely hope your donation experience was a pleasant one and wish you good health and well being.        Your Transplant Team at [facility]

## 2018-11-06 NOTE — ANESTHESIA RELEASE NOTE
"Anesthesia Release from PACU Note    Patient: Kacie Martell    Procedure(s) Performed: Procedure(s) (LRB):  ROBOTIC NEPHRECTOMY, DONOR (Right)    Anesthesia type: general    Post pain: Adequate analgesia    Post assessment: no apparent anesthetic complications    Last Vitals:   Visit Vitals  /73   Pulse 75   Temp 37.3 °C (99.2 °F) (Oral)   Resp 18   Ht 5' 6" (1.676 m)   Wt 74.8 kg (165 lb)   LMP 06/06/2018 (Exact Date)   SpO2 97%   Breastfeeding? No   BMI 26.63 kg/m²       Post vital signs: stable    Level of consciousness: awake, alert  and oriented    Nausea/Vomiting: no nausea/no vomiting    Complications: none    Airway Patency: patent    Respiratory: unassisted    Cardiovascular: stable and blood pressure at baseline    Hydration: euvolemic  "

## 2018-11-06 NOTE — ANESTHESIA POSTPROCEDURE EVALUATION
"Anesthesia Post Evaluation    Patient: Kacie Martell    Procedure(s) Performed: Procedure(s) (LRB):  ROBOTIC NEPHRECTOMY, DONOR (Right)    Final Anesthesia Type: general  Patient location during evaluation: PACU  Patient participation: Yes- Able to Participate  Level of consciousness: awake and alert  Post-procedure vital signs: reviewed and stable  Pain management: adequate  Airway patency: patent  PONV status at discharge: No PONV  Anesthetic complications: no      Cardiovascular status: blood pressure returned to baseline  Respiratory status: unassisted  Hydration status: euvolemic  Follow-up not needed.        Visit Vitals  /73   Pulse 75   Temp 37.3 °C (99.2 °F) (Oral)   Resp 18   Ht 5' 6" (1.676 m)   Wt 74.8 kg (165 lb)   LMP 06/06/2018 (Exact Date)   SpO2 97%   Breastfeeding? No   BMI 26.63 kg/m²       Pain/Barrington Score: Pain Assessment Performed: Yes (11/6/2018  8:00 AM)  Presence of Pain: complains of pain/discomfort (11/6/2018  8:00 AM)  Pain Rating Prior to Med Admin: 5 (11/6/2018  8:15 AM)  Barrington Score: 10 (11/5/2018 12:28 PM)        "

## 2018-11-06 NOTE — PROGRESS NOTES
Discharge instructions reviewed with the patient. Patient verbalized her understanding and denies any questions or concerns at this time. Left FA PIV and Visi monitor removed. Patient preparing for discharge, awaiting patient escort.

## 2018-11-06 NOTE — DISCHARGE SUMMARY
[facility]  Kidney Transplant  Discharge Summary    Patient Name: Kacie Martell  MRN: [id number]  Admission Date: 11/5/2018  Hospital Length of Stay: 1 days  Discharge Date and Time:  11/06/2018 12:21 PM  Attending Physician: Jose Yao MD   Discharging Provider: Damian Larson PA-C  Primary Care Provider: Primary Doctor No    HPI/Hospital Course:    Ms. Martell is a 27 yo F admitted for kidney transplant donor nephrectomy to her friend.  Surgery went well without complication. Pt with expected post operative pain controlled with oral tyenol.  With post operative nausea controlled with ambulation, eating, and Zofran.  Surgical incision on day of discharge cdi no ssi.  Labs stable post operatively.  Irby removed on POD 1 AM without difficulty.  She was discharged home in good, stable condition and will follow up per donor protocol.      Final Active Diagnoses:    Diagnosis Date Noted POA    PRINCIPAL PROBLEM:  s/p donor nephrectomy 11/5/18 [Z90.5] 11/05/2018 Not Applicable      Problems Resolved During this Admission:       Treatments: As above.    Consults (From admission, onward)        Status Ordering Provider     Inpatient consult to Registered Dietitian/Nutritionist  Once     Provider:  (Not yet assigned)    Completed FLORENCIA DOYLE          Pending Diagnostic Studies:     None        Significant Diagnostic Studies: Labs:   BMP:   Recent Labs   Lab 11/06/18  0716   GLU 88      K 3.6      CO2 24   BUN 9   CREATININE 1.2   CALCIUM 8.9   , CBC   Recent Labs   Lab 11/06/18  0716   WBC 10.56   HGB 10.7*   HCT 32.3*       and All labs within the past 24 hours have been reviewed    Discharged Condition: good    Disposition: Home or Self Care    Patient Instructions:      Diet Adult Regular     No dressing needed     Notify your health care provider if you experience any of the following:  temperature >100.4     Notify your health care provider if you experience any  of the following:  persistent nausea and vomiting or diarrhea     Notify your health care provider if you experience any of the following:  severe uncontrolled pain     Notify your health care provider if you experience any of the following:  redness, tenderness, or signs of infection (pain, swelling, redness, odor or green/yellow discharge around incision site)     Notify your health care provider if you experience any of the following:  difficulty breathing or increased cough     Notify your health care provider if you experience any of the following:  severe persistent headache     Notify your health care provider if you experience any of the following:  worsening rash     Notify your health care provider if you experience any of the following:  increased confusion or weakness     Notify your health care provider if you experience any of the following:  persistent dizziness, light-headedness, or visual disturbances     Activity as tolerated     Medications:  Reconciled Home Medications:      Medication List      START taking these medications    acetaminophen 325 MG tablet  Commonly known as:  TYLENOL  Take 2 tablets (650 mg total) by mouth every 6 (six) hours as needed.     bisacodyl 5 mg EC tablet  Commonly known as:  DULCOLAX  Take 2 tablets (10 mg total) by mouth daily as needed for Constipation.     docusate sodium 100 MG capsule  Commonly known as:  COLACE  Take 1 capsule (100 mg total) by mouth 3 (three) times daily as needed for Constipation.     oxyCODONE-acetaminophen  mg per tablet  Commonly known as:  PERCOCET  Take 0.5-1 tablets by mouth every 4 (four) hours as needed for Pain.          Time spent caring for patient (Greater than 1/2 spent in direct face-to-face contact): > 30 minutes    Damian Larson PA-C  Kidney Transplant  [facility]

## 2018-11-12 ENCOUNTER — TELEPHONE (OUTPATIENT)
Dept: TRANSPLANT | Facility: CLINIC | Age: 28
End: 2018-11-12

## 2018-11-12 NOTE — TELEPHONE ENCOUNTER
Received a call from patient requesting a release to return to work.  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  Spoke to Dr. Braswell and states patient will need to be seen in clinic prior to releasing to return to work.  Called patient to discuss plan and patient expressed frustration with her post op care and not being released to return to work.  Pt expressed wanting to speak to someone regarding her concerns.  Offered moving appointment up to 11/14/18 to see surgeon for clearance.  Appointment moved up from Friday, 11/16/18 to Wednesday, 11/14/18.  Pt agreeable with plan.  Discussed with Dr. Foote, clinic surgeon this week.  Contacted Nery Pan, donor advocate, regarding patient's concerns.

## 2018-11-14 ENCOUNTER — OFFICE VISIT (OUTPATIENT)
Dept: TRANSPLANT | Facility: CLINIC | Age: 28
End: 2018-11-14
Payer: COMMERCIAL

## 2018-11-14 ENCOUNTER — LAB VISIT (OUTPATIENT)
Dept: LAB | Facility: HOSPITAL | Age: 28
End: 2018-11-14
Attending: TRANSPLANT SURGERY
Payer: COMMERCIAL

## 2018-11-14 ENCOUNTER — NURSE TRIAGE (OUTPATIENT)
Dept: ADMINISTRATIVE | Facility: CLINIC | Age: 28
End: 2018-11-14

## 2018-11-14 ENCOUNTER — CLINICAL SUPPORT (OUTPATIENT)
Dept: TRANSPLANT | Facility: CLINIC | Age: 28
End: 2018-11-14
Payer: COMMERCIAL

## 2018-11-14 VITALS
WEIGHT: 170 LBS | HEIGHT: 66 IN | OXYGEN SATURATION: 97 % | OXYGEN SATURATION: 97 % | RESPIRATION RATE: 18 BRPM | SYSTOLIC BLOOD PRESSURE: 125 MMHG | BODY MASS INDEX: 27.32 KG/M2 | DIASTOLIC BLOOD PRESSURE: 79 MMHG | TEMPERATURE: 98 F | HEART RATE: 81 BPM | WEIGHT: 170 LBS | DIASTOLIC BLOOD PRESSURE: 79 MMHG | TEMPERATURE: 98 F | BODY MASS INDEX: 27.32 KG/M2 | RESPIRATION RATE: 18 BRPM | HEART RATE: 81 BPM | SYSTOLIC BLOOD PRESSURE: 125 MMHG | HEIGHT: 66 IN

## 2018-11-14 DIAGNOSIS — Z00.5 TRANSPLANT DONOR EVALUATION: ICD-10-CM

## 2018-11-14 DIAGNOSIS — Z90.5 S/P NEPHRECTOMY: Primary | ICD-10-CM

## 2018-11-14 LAB
ALBUMIN SERPL BCP-MCNC: 3.6 G/DL
ALP SERPL-CCNC: 70 U/L
ALT SERPL W/O P-5'-P-CCNC: 12 U/L
ANION GAP SERPL CALC-SCNC: 8 MMOL/L
AST SERPL-CCNC: 17 U/L
BASOPHILS # BLD AUTO: 0.06 K/UL
BASOPHILS NFR BLD: 0.7 %
BILIRUB SERPL-MCNC: 0.2 MG/DL
BUN SERPL-MCNC: 7 MG/DL
CALCIUM SERPL-MCNC: 9.8 MG/DL
CHLORIDE SERPL-SCNC: 105 MMOL/L
CO2 SERPL-SCNC: 27 MMOL/L
CREAT SERPL-MCNC: 0.9 MG/DL
DIFFERENTIAL METHOD: ABNORMAL
EOSINOPHIL # BLD AUTO: 0.3 K/UL
EOSINOPHIL NFR BLD: 3.1 %
ERYTHROCYTE [DISTWIDTH] IN BLOOD BY AUTOMATED COUNT: 12.2 %
EST. GFR  (AFRICAN AMERICAN): >60 ML/MIN/1.73 M^2
EST. GFR  (NON AFRICAN AMERICAN): >60 ML/MIN/1.73 M^2
GLUCOSE SERPL-MCNC: 81 MG/DL
HCT VFR BLD AUTO: 36.3 %
HGB BLD-MCNC: 12 G/DL
IMM GRANULOCYTES # BLD AUTO: 0.02 K/UL
IMM GRANULOCYTES NFR BLD AUTO: 0.2 %
LYMPHOCYTES # BLD AUTO: 2.9 K/UL
LYMPHOCYTES NFR BLD: 35.3 %
MCH RBC QN AUTO: 29.5 PG
MCHC RBC AUTO-ENTMCNC: 33.1 G/DL
MCV RBC AUTO: 89 FL
MONOCYTES # BLD AUTO: 0.7 K/UL
MONOCYTES NFR BLD: 8.6 %
NEUTROPHILS # BLD AUTO: 4.3 K/UL
NEUTROPHILS NFR BLD: 52.1 %
NRBC BLD-RTO: 0 /100 WBC
PLATELET # BLD AUTO: 369 K/UL
PMV BLD AUTO: 9.6 FL
POTASSIUM SERPL-SCNC: 4.2 MMOL/L
PROT SERPL-MCNC: 7.3 G/DL
RBC # BLD AUTO: 4.07 M/UL
SODIUM SERPL-SCNC: 140 MMOL/L
WBC # BLD AUTO: 8.16 K/UL

## 2018-11-14 PROCEDURE — 99999 PR PBB SHADOW E&M-EST. PATIENT-LVL III: CPT | Mod: PBBFAC,,,

## 2018-11-14 PROCEDURE — 36415 COLL VENOUS BLD VENIPUNCTURE: CPT

## 2018-11-14 PROCEDURE — 80053 COMPREHEN METABOLIC PANEL: CPT

## 2018-11-14 PROCEDURE — 3008F BODY MASS INDEX DOCD: CPT | Mod: CPTII,S$GLB,, | Performed by: TRANSPLANT SURGERY

## 2018-11-14 PROCEDURE — 85025 COMPLETE CBC W/AUTO DIFF WBC: CPT

## 2018-11-14 PROCEDURE — 99214 OFFICE O/P EST MOD 30 MIN: CPT | Mod: 24,S$GLB,, | Performed by: TRANSPLANT SURGERY

## 2018-11-14 NOTE — LETTER
November 15, 2018      [facility]  [address]  [facility] [address]  Phone: [phone]       Patient: Kacie Martell   YOB: 1990  Date of Visit: 11/15/2018    To Whom It May Concern:    Jerel Martell  was at [facility] on 11/15/2018. She may return to work on 11/15/18 with restrictions.  No lifting, pushing,pulling, or carrying anything heavier than 10 pounds for the next 30 days. She is able to operate a company vehicle without any driving restrictions as she is no longer taking pain medication.     If you have any questions or concerns, or if I can be of further assistance, please do not hesitate to contact me.    Sincerely,    Flavio Cooper RN  Living Donor Coordinator  [phone]

## 2018-11-14 NOTE — LETTER
November 14, 2018    Kacie Martell  [address]  [facility] [address]         [facility]  [address]  [place] [address]  Phone: [phone] November 14, 2018     Patient: Kacie Martell   YOB: 1990   Date of Visit: 11/14/2018       To Whom It May Concern:    Ms. Martell was seen today in follow up of her recent surgery. She may return to work, light duty, immediately with the following restrictions: No lifting, pushing,pulling, or carrying anything heavier than 10 pounds for the next 30 days. .    If you have any questions or concerns, please don't hesitate to call.    Sincerely,        Amaris Tolliver RN  Living Kidney Donor Coordinator  [phone]

## 2018-11-14 NOTE — PROGRESS NOTES
Transplant Surgery Kidney Donor Follow-up    Chief Complaint: Kacie Martell is a 28 y.o. year old female who  Underwent right robotic/laparoscopic donor nephrectomy on .  She presents for surgical follow-up.  Current symptoms include none.     Review of Systems   Constitutional: Negative for activity change, appetite change, chills, fatigue and fever.   HENT: Negative for sore throat and trouble swallowing.    Eyes: Negative for visual disturbance.   Respiratory: Negative for cough, chest tightness and shortness of breath.    Cardiovascular: Negative for chest pain, palpitations and leg swelling.   Gastrointestinal: Negative for abdominal distention, abdominal pain, blood in stool, constipation, diarrhea, nausea and vomiting.   Endocrine: Negative for polyuria.   Genitourinary: Negative for decreased urine volume, difficulty urinating, dysuria, flank pain, frequency and hematuria.   Musculoskeletal: Negative for gait problem, myalgias and neck stiffness.   Skin: Negative for rash and wound.   Neurological: Negative for dizziness, tremors, seizures, weakness, light-headedness and headaches.   Hematological: Negative for adenopathy.   Psychiatric/Behavioral: Negative for agitation, confusion and sleep disturbance.     Physical Exam   Constitutional: She is oriented to person, place, and time. She appears well-developed and well-nourished. No distress.   HENT:   Head: Normocephalic.   Mouth/Throat: Oropharynx is clear and moist.   Eyes: Conjunctivae are normal. Pupils are equal, round, and reactive to light. No scleral icterus.   Neck: Normal range of motion. Neck supple. No tracheal deviation present. No thyromegaly present.   Cardiovascular: Normal rate, regular rhythm, normal heart sounds and intact distal pulses.   No murmur heard.  Pulmonary/Chest: Effort normal and breath sounds normal. No respiratory distress.   No supraclavicular adenopathy   Abdominal: Soft. Bowel sounds are normal. She exhibits no  distension and no mass. There is no tenderness. There is no rebound and no guarding.       No inguinal adenopathy   Musculoskeletal: Normal range of motion. She exhibits no edema.   No clubbing or cyanosis   Lymphadenopathy:     She has no cervical adenopathy.   Neurological: She is alert and oriented to person, place, and time.   Skin: Skin is warm and dry. No rash noted. No erythema.   Psychiatric: She has a normal mood and affect. Her behavior is normal.   Vitals reviewed.    Lab Results   Component Value Date    BUN 9 11/06/2018    CREATININE 1.2 11/06/2018     Lab Results   Component Value Date    WBC 8.16 11/14/2018    HGB 12.0 11/14/2018    HCT 36.3 (L) 11/14/2018     (H) 11/14/2018       Assessment and Plan:  No diagnosis found.    Kacie is doing well following living kidney donation.  She  is advised to refrain from heavy lifting for a period of two to four weeks from the date of surgery and then gradually resume normal activities. She will return for an additional follow-up visit in approximately  2 weeks.  She is cleared to return to work.    I discussed the need for our program to be able to contact living donors for UNOS reporting purposes for a minimum of 2 years.  Failure of our center to be able to provide such information could jeopardize our ability to continue to offer living donor transplants.  Kacie voices understanding and agrees to this follow-up.

## 2018-11-14 NOTE — PROGRESS NOTES
Patient here for first post op visit. States she is doing fine and ready to return to work. States she has been driving since Saturday. Taking no narcotic pain medication at this time. Release to return to work with restrictions provided per instructions of Dr. Foote. Reinforced restrictions of no lifting, pushing, pulling or carrying anything heavier than 10# for the next 30 days.   Patient expressed disappointment in the recovery and hospital experience in regards to response time of nurses. She would like to speak with patient relations. Explained that they are aware and will be reaching out to her. All questions were answered and patient verbalized understanding.

## 2018-11-14 NOTE — LETTER
November 15, 2018      [facility]  [address]  [facility] [address]  Phone: [phone]       Patient: Kacie Martell   YOB: 1990  Date of Visit: 11/15/2018    To Whom It May Concern:    Jerel Martell  was at [facility] on 11/15/2018. She may return to work on 11/15/18 with restrictions. No lifting, pushing,pulling, or carrying anything heavier than 10 pounds for the next 30 days. She is able to drive a vehicle as she is no longer taking pain medication.    If you have any questions or concerns, or if I can be of further assistance, please do not hesitate to contact me.    Sincerely,    Flavio Cooper RN  Living Donor Coordinator  [phone]

## 2018-11-15 NOTE — TELEPHONE ENCOUNTER
Reason for Disposition   Caller has URGENT question and triager unable to answer question    Answer Assessment - Initial Assessment Questions  Kidney donor 11/5/18    Pt experienced a sudden onset of severe pain in right collarbone traveling down inside of right arm to elbow. Occurred about 20 min ago- pain is resolving. No sob, nausea or other sx's. She was wondering if it was gas pain related. Had pain post surgery in shoulder blade area but since Friday pain had resolved.    Protocols used: ST POST-OP SYMPTOMS AND [id number]

## 2018-11-19 DIAGNOSIS — Z00.5 TRANSPLANT DONOR EVALUATION: Primary | ICD-10-CM

## 2018-12-12 ENCOUNTER — TELEPHONE (OUTPATIENT)
Dept: TRANSPLANT | Facility: CLINIC | Age: 28
End: 2018-12-12

## 2018-12-12 NOTE — TELEPHONE ENCOUNTER
Pt called to reschedule appointment as patient no showed previous appt.  Rescheduled for 12/14/18.  All questions answered and patient verbalized understanding.

## 2018-12-14 ENCOUNTER — LAB VISIT (OUTPATIENT)
Dept: LAB | Facility: HOSPITAL | Age: 28
End: 2018-12-14
Attending: TRANSPLANT SURGERY
Payer: COMMERCIAL

## 2018-12-14 ENCOUNTER — OFFICE VISIT (OUTPATIENT)
Dept: TRANSPLANT | Facility: CLINIC | Age: 28
End: 2018-12-14
Payer: MEDICAID

## 2018-12-14 VITALS
HEART RATE: 84 BPM | DIASTOLIC BLOOD PRESSURE: 86 MMHG | WEIGHT: 167.56 LBS | RESPIRATION RATE: 18 BRPM | SYSTOLIC BLOOD PRESSURE: 116 MMHG | OXYGEN SATURATION: 98 % | HEIGHT: 67 IN | BODY MASS INDEX: 26.3 KG/M2 | TEMPERATURE: 99 F

## 2018-12-14 DIAGNOSIS — Z52.4 KIDNEY DONOR: Primary | ICD-10-CM

## 2018-12-14 DIAGNOSIS — Z00.5 TRANSPLANT DONOR EVALUATION: ICD-10-CM

## 2018-12-14 LAB
ALBUMIN SERPL BCP-MCNC: 4.1 G/DL
ANION GAP SERPL CALC-SCNC: 10 MMOL/L
BASOPHILS # BLD AUTO: 0.04 K/UL
BASOPHILS NFR BLD: 0.5 %
BUN SERPL-MCNC: 13 MG/DL
CALCIUM SERPL-MCNC: 9.9 MG/DL
CHLORIDE SERPL-SCNC: 101 MMOL/L
CO2 SERPL-SCNC: 29 MMOL/L
CREAT SERPL-MCNC: 1.1 MG/DL
DIFFERENTIAL METHOD: ABNORMAL
EOSINOPHIL # BLD AUTO: 0.1 K/UL
EOSINOPHIL NFR BLD: 1.4 %
ERYTHROCYTE [DISTWIDTH] IN BLOOD BY AUTOMATED COUNT: 12.2 %
EST. GFR  (AFRICAN AMERICAN): >60 ML/MIN/1.73 M^2
EST. GFR  (NON AFRICAN AMERICAN): >60 ML/MIN/1.73 M^2
GLUCOSE SERPL-MCNC: 61 MG/DL
HCT VFR BLD AUTO: 42.8 %
HGB BLD-MCNC: 13.9 G/DL
IMM GRANULOCYTES # BLD AUTO: 0.01 K/UL
IMM GRANULOCYTES NFR BLD AUTO: 0.1 %
LYMPHOCYTES # BLD AUTO: 3.5 K/UL
LYMPHOCYTES NFR BLD: 46.3 %
MCH RBC QN AUTO: 29.2 PG
MCHC RBC AUTO-ENTMCNC: 32.5 G/DL
MCV RBC AUTO: 90 FL
MONOCYTES # BLD AUTO: 0.5 K/UL
MONOCYTES NFR BLD: 6.3 %
NEUTROPHILS # BLD AUTO: 3.5 K/UL
NEUTROPHILS NFR BLD: 45.4 %
NRBC BLD-RTO: 0 /100 WBC
PHOSPHATE SERPL-MCNC: 3.9 MG/DL
PLATELET # BLD AUTO: 354 K/UL
PMV BLD AUTO: 9.9 FL
POTASSIUM SERPL-SCNC: 3.6 MMOL/L
RBC # BLD AUTO: 4.76 M/UL
SODIUM SERPL-SCNC: 140 MMOL/L
WBC # BLD AUTO: 7.62 K/UL

## 2018-12-14 PROCEDURE — 99999 PR PBB SHADOW E&M-EST. PATIENT-LVL III: CPT | Mod: PBBFAC,,, | Performed by: TRANSPLANT SURGERY

## 2018-12-14 PROCEDURE — 85025 COMPLETE CBC W/AUTO DIFF WBC: CPT

## 2018-12-14 PROCEDURE — 99024 POSTOP FOLLOW-UP VISIT: CPT | Mod: ,,, | Performed by: TRANSPLANT SURGERY

## 2018-12-14 PROCEDURE — 80069 RENAL FUNCTION PANEL: CPT

## 2018-12-14 PROCEDURE — 99213 OFFICE O/P EST LOW 20 MIN: CPT | Mod: PBBFAC | Performed by: TRANSPLANT SURGERY

## 2018-12-14 PROCEDURE — 36415 COLL VENOUS BLD VENIPUNCTURE: CPT

## 2018-12-14 NOTE — LETTER
December 14, 2018                     [address] [address]  [address]  [facility] [address]  Phone: [phone]   Patient: Kacie Martell   MR Number: [id number]   YOB: 1990   Date of Visit: 12/14/2018       Dear      Thank you for referring Kacie Martell to me for evaluation. Attached you will find relevant portions of my assessment and plan of care.    If you have questions, please do not hesitate to call me. I look forward to following Kacie Martell along with you.    Sincerely,    Rashel Skaggs MD    Enclosure    If you would like to receive this communication electronically, please contact [email or web address] or [phone] to request [employer] access.    [employer] is a tool which provides read-only access to select patient information with whom you have a relationship. Its easy to use and provides real time access to review your patients record including encounter summaries, notes, results, and demographic information.    If you feel you have received this communication in error or would no longer like to receive these types of communications, please e-mail [email or web address]

## 2018-12-14 NOTE — PROGRESS NOTES
Transplant Surgery Kidney Donor Follow-up    Chief Complaint: Kacie Martell is a 28 y.o. year old female who  Underwent right robotic/laparoscopic donor nephrectomy on .  She presents for surgical follow-up.  Current symptoms include none.     Review of Systems   Constitutional: Negative.  Negative for appetite change, chills, fatigue, fever and unexpected weight change.   HENT: Negative for dental problem, facial swelling, mouth sores, nosebleeds, sore throat and voice change.    Eyes: Negative for photophobia, discharge, itching and visual disturbance.   Respiratory: Negative for cough, chest tightness, shortness of breath, wheezing and stridor.    Cardiovascular: Negative for chest pain, palpitations and leg swelling.   Gastrointestinal: Negative for abdominal distention, abdominal pain, blood in stool, constipation, diarrhea, nausea and vomiting.   Genitourinary: Negative for difficulty urinating, dysuria, hematuria, urgency, vaginal bleeding and vaginal discharge.   Musculoskeletal: Negative for arthralgias, back pain, joint swelling and myalgias.   Skin: Negative for color change, rash and wound.   Neurological: Negative for dizziness, tremors, seizures, syncope, speech difficulty, numbness and headaches.   Hematological: Negative for adenopathy. Does not bruise/bleed easily.   Psychiatric/Behavioral: Negative for agitation, confusion, dysphoric mood, hallucinations and sleep disturbance. The patient is not nervous/anxious.      Physical Exam   Constitutional: She is oriented to person, place, and time. She appears well-developed and well-nourished.   HENT:   Head: Normocephalic and atraumatic.   Right Ear: External ear normal.   Left Ear: External ear normal.   Nose: Nose normal.   Mouth/Throat: Oropharynx is clear and moist. No oropharyngeal exudate.   Eyes: EOM are normal. Pupils are equal, round, and reactive to light. No scleral icterus.   Neck: Normal range of motion. Neck supple. No JVD present. No  tracheal deviation present. No thyromegaly present.   Cardiovascular: Normal rate, regular rhythm, normal heart sounds and intact distal pulses. Exam reveals no gallop and no friction rub.   No murmur heard.  Pulmonary/Chest: Effort normal and breath sounds normal. No respiratory distress. She has no wheezes. She has no rales.   Abdominal: Soft. Bowel sounds are normal. She exhibits no distension and no mass. There is no tenderness.       Musculoskeletal: Normal range of motion. She exhibits no edema or tenderness.   Lymphadenopathy:     She has no cervical adenopathy.   Neurological: She is alert and oriented to person, place, and time. No cranial nerve deficit. Coordination normal.   Skin: Skin is warm and dry. No rash noted. No erythema.   Psychiatric: She has a normal mood and affect. Her behavior is normal. Judgment and thought content normal.     Lab Results   Component Value Date    BUN 13 12/14/2018    CREATININE 1.1 12/14/2018     Lab Results   Component Value Date    WBC 7.62 12/14/2018    HGB 13.9 12/14/2018    HCT 42.8 12/14/2018     (H) 12/14/2018       Assessment and Plan:  1. Kidney donor        Kacie is doing well following living kidney donation.  She  is advised to refrain from heavy lifting for a period of two to four weeks from the date of surgery and then gradually resume normal activities. She is discharged from transplant surgical follow-up at this time. She should have an annual physician visit with routine laboratory and hypertension screening.    I discussed the need for our program to be able to contact living donors for UNOS reporting purposes for a minimum of 2 years.  Failure of our center to be able to provide such information could jeopardize our ability to continue to offer living donor transplants.  Kacie voices understanding and agrees to this follow-up.

## 2019-03-12 ENCOUNTER — TELEPHONE (OUTPATIENT)
Dept: TRANSPLANT | Facility: CLINIC | Age: 29
End: 2019-03-12

## 2019-03-12 NOTE — TELEPHONE ENCOUNTER
Patient called stating she starting having abdominal pain this morning.  Denies any other symptoms.  Instructed patient to follow up with PCP for evaluation.      Kacie Martell    3/12/2019    [id number]    Description: female 1990    Attempt at contact: 1st    Follow-up Period:6 month    Physical Capacity: No limitations    Working for income: Yes. Working full-time    Loss of medical insurance due to donation: No    Current weight: 165 lbs.     Date of weight measurement: 3/12/19    Any ER visits since last contact: No    Any hospitalizations since last contact: No    Any medical issues since last contact: No    Dialysis required: No    Diabetes: No    Any kidney complications: No    Notes: Pt reports doing well and only complaints of pain starting today.  Will follow up with PCP.  Follow up labs scheduled for 4/8/19 in [place].

## 2019-03-14 DIAGNOSIS — Z00.5 TRANSPLANT DONOR EVALUATION: Primary | ICD-10-CM

## 2019-04-15 ENCOUNTER — TELEPHONE (OUTPATIENT)
Dept: TRANSPLANT | Facility: CLINIC | Age: 29
End: 2019-04-15

## 2019-04-15 NOTE — TELEPHONE ENCOUNTER
Patient called stating she believes she may have a UTI.  Instructed patient to follow up with PCP or urgent care for workup.  Discussed rescheduling follow up as she no showed the labs scheduled for 4/8/19.  Instructed patient to contact team with any questions or concerns.  Pt stated she will call back to reschedule labs.  Pt All questions answered and patient verbalized understanding.

## 2019-05-24 ENCOUNTER — TELEPHONE (OUTPATIENT)
Dept: TRANSPLANT | Facility: CLINIC | Age: 29
End: 2019-05-24

## 2019-09-04 NOTE — LETTER
September 4, 2019    Kacie Martell  [address]  [facility] [address]         Dear Kacie Martell:    Thank you again for your generous decision to donate your kidney. This is truly an amazing gift that you have given.    In order to ensure your health after donation and to meet the requirements for all donors with the governmental agency, UNOS ([employer]), we are required to report on your progress and health at specific intervals.     It is very important after donation, that you establish with a Primary Care Physician to maintain your health.  These visits will be paid under your own health insurance since your recipients insurance will not cover this routine care.    It is now time for your 1 year follow up.    ? Please schedule an appointment with your physician for a checkup.     ? In order to watch your kidney function, the following tests are required to be checked between the dates of  09/05/2019 - 01/05/2020    o Blood creatinine  o Urinalysis     ? Please have your physician fax your test results and office visit note to [phone].    ? If you need assistance scheduling lab or do not have health insurance, please contact us for appointments.    Please feel free to call if you have any questions regarding this request for information. We greatly appreciate your assistance and sincerely hope your donation experience was a pleasant one.     Sincerely,      Ofelia Jean RN, BSN, [facility]  Senior Transplant Coordinator  [facility]  Phone [phone]  Fax  [phone]  [email or web address]

## 2019-09-17 ENCOUNTER — TELEPHONE (OUTPATIENT)
Dept: TRANSPLANT | Facility: CLINIC | Age: 29
End: 2019-09-17

## 2019-09-17 NOTE — TELEPHONE ENCOUNTER
----- Message from Lacy Marie RN sent at 9/17/2019  9:45 AM CDT -----  Contact: pt      ----- Message -----  From: Nan Brooks MA  Sent: 9/17/2019   9:30 AM  To: [facility] Liver Living Donor Coordinator    Would like to be liver donor.      Pt [phone]

## 2019-09-17 NOTE — TELEPHONE ENCOUNTER
Returned call to potential donor. Per our lead living donor surgeon, it is possible that she may be considered for potential donation to a pediatric liver patient. Will discuss with the team tomorrow in committee & speak with Ms. Martell tomorrow. Understanding expressed. No other questions at this time.

## 2019-09-18 ENCOUNTER — CONFERENCE (OUTPATIENT)
Dept: TRANSPLANT | Facility: CLINIC | Age: 29
End: 2019-09-18

## 2019-09-18 ENCOUNTER — TELEPHONE (OUTPATIENT)
Dept: TRANSPLANT | Facility: CLINIC | Age: 29
End: 2019-09-18

## 2019-09-18 NOTE — TELEPHONE ENCOUNTER
Pt's case discussed in liver committee meeting. Patient is a prior kidney donor, now wishes to donate portion of her liver, if possible. Per committee do not recommend adult liver donation. Ok to consider evaluation for pediatric donor.

## 2019-09-18 NOTE — TELEPHONE ENCOUNTER
Called Kacie to inform her that it is possible that she might be worked up as a potential liver donor for a pediatric recipient. As there are a couple of pediatric patients on the list, the team wishes to weigh their options & will call Kacie if a potential recipient is located. Understanding expressed. No other questions at this time.

## 2019-10-07 ENCOUNTER — PATIENT MESSAGE (OUTPATIENT)
Dept: TRANSPLANT | Facility: CLINIC | Age: 29
End: 2019-10-07

## 2019-10-18 ENCOUNTER — TELEPHONE (OUTPATIENT)
Dept: TRANSPLANT | Facility: CLINIC | Age: 29
End: 2019-10-18

## 2019-10-18 NOTE — TELEPHONE ENCOUNTER
Kacie Martell    10/18/2019    [id number]    Description: female 1990    Attempt at contact: 2nd    Follow-up Period:1 year    Physical Capacity: No limitations    Working for income: Yes. Working full-time    Loss of medical insurance due to donation: no. Currently no insurance. Will start a new job at the end of this month    Current weight: 175 lbs.    Date of weight measurement: 10/18/19    Any ER visits since last contact: no    Any hospitalizations since last contact: No    Any medical issues since last contact: No    Dialysis required: No    Diabetes: No    Any kidney complications: No    Notes: B/P 116/70

## 2019-10-18 NOTE — TELEPHONE ENCOUNTER
Spoke with Kacie,  She has moved to [place]. Address updated in Epic. Will send lab orders for her 1 year follow up via email per her request.                ----- Message from Ivana Gallo sent at 10/17/2019  3:57 PM CDT -----  Contact: Kacie Pinto. @ [phone]      ----- Message -----  From: Riya Balbuena  Sent: 10/17/2019   3:54 PM CDT  To: [facility] Staff    Pt requesting 1 year post op including labs.

## 2020-06-08 ENCOUNTER — TELEPHONE (OUTPATIENT)
Dept: TRANSPLANT | Facility: CLINIC | Age: 30
End: 2020-06-08

## 2020-06-08 NOTE — TELEPHONE ENCOUNTER
Spoke with pt regarding 2 year follow up donor labs. Informed her that she will need to have labs drawn on or after September 5, 2020 per UNOS guidelines for living donor follow up.     Also asked question in reference to living liver donation post kidney donation.  Will discuss with transplant team and get back to her.

## 2020-09-08 ENCOUNTER — TELEPHONE (OUTPATIENT)
Dept: TRANSPLANT | Facility: CLINIC | Age: 30
End: 2020-09-08

## 2020-09-08 NOTE — TELEPHONE ENCOUNTER
Returned pt's call.  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 She states that she is having symptoms and is currently being tested.   Reviewed all available transplant medical records while patient was on the phone.  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 Let her know that I will continue to search and let her know if anything is found.         ----- Message from Nighat Pena sent at 9/8/2020 12:12 PM CDT -----    ----- Message -----  From: Tessa Styles  Sent: 9/8/2020  10:40 AM CDT  To: [facility] Kidney Living Donor Coordinator    Pt has some questions regarding something that appears on her medical history     Pt# [phone]

## 2020-11-06 ENCOUNTER — TELEPHONE (OUTPATIENT)
Dept: TRANSPLANT | Facility: CLINIC | Age: 30
End: 2020-11-06

## 2020-11-06 NOTE — TELEPHONE ENCOUNTER
Left VM for pt to call back at her earliest convenience         ----- Message from Ofelia Jean RN sent at 11/5/2020  4:09 PM CST -----    ----- Message -----  From: Tessa Styles  Sent: 11/5/2020  12:06 PM CST  To: [facility] Kidney Living Donor Coordinator    Pt is calling for orders for labs for 2 year f/u. Please call@[phone]

## 2020-11-10 ENCOUNTER — TELEPHONE (OUTPATIENT)
Dept: TRANSPLANT | Facility: CLINIC | Age: 30
End: 2020-11-10

## 2020-11-10 DIAGNOSIS — Z00.00 ROUTINE GENERAL MEDICAL EXAMINATION AT HEALTH CARE FACILITY: Primary | ICD-10-CM

## 2020-11-10 NOTE — TELEPHONE ENCOUNTER
Kacie Martell    11/10/2020    [id number]    Description: female 1990    Attempt at contact: 2nd    Follow-up Period:2 year    Physical Capacity: No limitations    Working for income: Yes. Working full-time    Loss of medical insurance due to donation: No    Current weight:  lbs.     Date of weight measurement:     Any ER visits since last contact: No    Any hospitalizations since last contact: No    Any medical issues since last contact: No   No issues related to donation.     She is having cardiac irregularities not related to donation and is currently under the care of a cardiologist locally   Notes: f/u with Dr. Hernández for:   Chest pain    Palpitations    Near syncope    Shortness of breath    Abnormal resting ECG findings.     Je Hernández MD    [address] [facility] [place] [address]    Phone: [phone]    Fax: [phone]          Dialysis required: No    Diabetes: No    Any kidney complications: No    Lab orders sent for 2 year follow up. Will complete locally and have the results faxed for our records

## 2020-12-14 ENCOUNTER — TELEPHONE (OUTPATIENT)
Dept: TRANSPLANT | Facility: CLINIC | Age: 30
End: 2020-12-14

## 2020-12-14 NOTE — TELEPHONE ENCOUNTER
Left VM for patient to contact us at earliest convenience regarding living kidney donation follow up

## 2022-07-22 ENCOUNTER — HOSPITAL ENCOUNTER (EMERGENCY)
Dept: HOSPITAL 92 - CSHERS | Age: 32
Discharge: HOME | End: 2022-07-22
Payer: COMMERCIAL

## 2022-07-22 DIAGNOSIS — K62.5: Primary | ICD-10-CM

## 2022-07-22 LAB
ALBUMIN SERPL BCG-MCNC: 4.7 G/DL (ref 3.5–5)
ALP SERPL-CCNC: 82 U/L (ref 40–110)
ALT SERPL W P-5'-P-CCNC: 18 U/L (ref 8–55)
ANION GAP SERPL CALC-SCNC: 12 MMOL/L (ref 10–20)
APTT PPP: 28.5 SEC (ref 22–33)
AST SERPL-CCNC: 20 U/L (ref 5–34)
BASOPHILS # BLD AUTO: 0.1 10X3/UL (ref 0–0.2)
BASOPHILS NFR BLD AUTO: 0.4 % (ref 0–2)
BILIRUB SERPL-MCNC: 0.3 MG/DL (ref 0.2–1.2)
BUN SERPL-MCNC: 9 MG/DL (ref 7–18.7)
CALCIUM SERPL-MCNC: 9.9 MG/DL (ref 7.8–10.44)
CHLORIDE SERPL-SCNC: 106 MMOL/L (ref 98–107)
CO2 SERPL-SCNC: 23 MMOL/L (ref 22–29)
CREAT CL PREDICTED SERPL C-G-VRATE: 0 ML/MIN (ref 70–130)
EOSINOPHIL # BLD AUTO: 0 10X3/UL (ref 0–0.5)
EOSINOPHIL NFR BLD AUTO: 0.3 % (ref 0–6)
GLOBULIN SER CALC-MCNC: 3.1 G/DL (ref 2.4–3.5)
GLUCOSE SERPL-MCNC: 97 MG/DL (ref 70–105)
HGB BLD-MCNC: 14.5 G/DL (ref 12–15.5)
INR PPP: 0.9
LIPASE SERPL-CCNC: 25 U/L (ref 8–78)
LYMPHOCYTES NFR BLD AUTO: 36.9 % (ref 18–47)
MAGNESIUM SERPL-MCNC: 2.3 MG/DL (ref 1.6–2.6)
MCH RBC QN AUTO: 30.5 PG (ref 27–33)
MCV RBC AUTO: 90.7 FL (ref 81.6–98.3)
MONOCYTES # BLD AUTO: 0.9 10X3/UL (ref 0–1.1)
MONOCYTES NFR BLD AUTO: 7.4 % (ref 0–10)
NEUTROPHILS # BLD AUTO: 6.5 10X3/UL (ref 1.5–8.4)
NEUTROPHILS NFR BLD AUTO: 54.7 % (ref 40–75)
PLATELET # BLD AUTO: 344 10X3/UL (ref 150–450)
POTASSIUM SERPL-SCNC: 4.1 MMOL/L (ref 3.5–5.1)
PREGS CONTROL BACKGROUND?: (no result)
PREGS CONTROL BAR APPEAR?: YES
PROTHROMBIN TIME: 9.9 SEC (ref 9.5–12.1)
RBC # BLD AUTO: 4.75 10X6/UL (ref 3.9–5.03)
SODIUM SERPL-SCNC: 137 MMOL/L (ref 136–145)
WBC # BLD AUTO: 11.9 10X3/UL (ref 3.5–10.5)

## 2022-07-22 PROCEDURE — 86901 BLOOD TYPING SEROLOGIC RH(D): CPT

## 2022-07-22 PROCEDURE — 74177 CT ABD & PELVIS W/CONTRAST: CPT

## 2022-07-22 PROCEDURE — 84703 CHORIONIC GONADOTROPIN ASSAY: CPT

## 2022-07-22 PROCEDURE — 94760 N-INVAS EAR/PLS OXIMETRY 1: CPT

## 2022-07-22 PROCEDURE — 80053 COMPREHEN METABOLIC PANEL: CPT

## 2022-07-22 PROCEDURE — 83735 ASSAY OF MAGNESIUM: CPT

## 2022-07-22 PROCEDURE — 83690 ASSAY OF LIPASE: CPT

## 2022-07-22 PROCEDURE — 93005 ELECTROCARDIOGRAM TRACING: CPT

## 2022-07-22 PROCEDURE — 85610 PROTHROMBIN TIME: CPT

## 2022-07-22 PROCEDURE — 85025 COMPLETE CBC W/AUTO DIFF WBC: CPT

## 2022-07-22 PROCEDURE — 85730 THROMBOPLASTIN TIME PARTIAL: CPT

## 2022-07-22 PROCEDURE — 36415 COLL VENOUS BLD VENIPUNCTURE: CPT

## 2022-07-22 PROCEDURE — 96360 HYDRATION IV INFUSION INIT: CPT

## 2022-07-22 PROCEDURE — 86850 RBC ANTIBODY SCREEN: CPT

## 2022-07-22 PROCEDURE — 86900 BLOOD TYPING SEROLOGIC ABO: CPT

## 2024-01-05 NOTE — NURSING
Labs and hx reviewed.  No nutrition intervention required at this time.      Rosario Guillory, MS RD LDN    
The patient is a 79y Female complaining of sent by MD.

## (undated) DEVICE — BLADE SURG CARBON STEEL SZ11

## (undated) DEVICE — SUT VICRYL 3-0 27 SH

## (undated) DEVICE — SOL NS 1000CC

## (undated) DEVICE — SUT 0 VICRYL / UR6 (J603)

## (undated) DEVICE — LEGGING CLEAR POLY 2/PACK

## (undated) DEVICE — SET DECANTER MEDICHOICE

## (undated) DEVICE — SEE MEDLINE ITEM 152622

## (undated) DEVICE — SUT 4-0 12-18IN SILK BLACK

## (undated) DEVICE — KIT ANTIFOG

## (undated) DEVICE — CANISTER SUCTION 3000CC

## (undated) DEVICE — DRAPE SLUSH WARMER WITH DISC

## (undated) DEVICE — HEMOSTAT SURGICEL NU-KNIT 6X9

## (undated) DEVICE — DRAPE ABDOMINAL TIBURON 14X11

## (undated) DEVICE — CHLORAPREP 3ML APPLICATOR TINT

## (undated) DEVICE — IRRIGATOR ENDOSCOPY DISP.

## (undated) DEVICE — BANDAGE ADHESIVE

## (undated) DEVICE — COVER LIGHT HANDLE

## (undated) DEVICE — PACK DRAPE PERI/GYN TIBURON

## (undated) DEVICE — SUT 1 36IN PDS II VIO MONO

## (undated) DEVICE — BOOT SUTURE AID

## (undated) DEVICE — SUT 3-0 12-18IN SILK

## (undated) DEVICE — DRAPE SCOPE PILLOW WARMER

## (undated) DEVICE — LABEL FOR UTILITY MARKER

## (undated) DEVICE — SUT MCRYL PLUS 4-0 PS2 27IN

## (undated) DEVICE — TUBING INSUFFLATION HEATED

## (undated) DEVICE — PACK SET UP CONVERTORS

## (undated) DEVICE — SUT PROLENE 6-0 BV-1 30IN

## (undated) DEVICE — SOL ELECTROLUBE ANTI-STIC

## (undated) DEVICE — SUT EASE CROSSBOW CLSR SYS

## (undated) DEVICE — SEE MEDLINE ITEM 146417

## (undated) DEVICE — LOOP VESSEL BLUE MAXI

## (undated) DEVICE — PENCIL ROCKER SWITCH 10FT CORD

## (undated) DEVICE — TRAY MINOR GEN SURG

## (undated) DEVICE — NDL HYPO REG 25G X 1 1/2

## (undated) DEVICE — CORD BIPOLAR 12 FOOT

## (undated) DEVICE — SPONGE NOVAPLUS LAP 18X18IN

## (undated) DEVICE — COVER LIGHT HANDLE 80/CA

## (undated) DEVICE — KIT DEV NOVASURE ENDOMETRIAL.

## (undated) DEVICE — DRAPE INCISE IOBAN 2 23X17IN

## (undated) DEVICE — SYS CLSR DERMABOND PRINEO 22CM

## (undated) DEVICE — SOL NACL IRR 1000ML BTL

## (undated) DEVICE — ADHESIVE MASTISOL VIAL 48/BX

## (undated) DEVICE — DRAPE BAG ISOLATION 20 X 20

## (undated) DEVICE — SOL NACL IRR 3000ML

## (undated) DEVICE — HOLDER TUBE

## (undated) DEVICE — ELECTRODE REM PLYHSV RETURN 9

## (undated) DEVICE — COVER SURG LIGHT HANDLE

## (undated) DEVICE — PACK TIBURON LAPAROSCOPY

## (undated) DEVICE — ELECTRODE SPATULA 5MMX 32CM

## (undated) DEVICE — TROCAR ENDOPATH XCEL 12X100MM

## (undated) DEVICE — TROCAR ENDO Z THREAD KII 5X100

## (undated) DEVICE — SEE MEDLINE ITEM 157116

## (undated) DEVICE — APPLICATOR CHLORAPREP ORN 26ML

## (undated) DEVICE — STAPLER SKIN ROTATING HEAD

## (undated) DEVICE — TROCAR SPACEMAKER BLUNT 12MM

## (undated) DEVICE — BLADE SURG #15 CARBON STEEL

## (undated) DEVICE — STAPLER INT LINEAR ARTC 3.5-45

## (undated) DEVICE — SUT 2-0 12-18IN SILK

## (undated) DEVICE — FORCEP TISSUE FRS SMTH6 3/4

## (undated) DEVICE — TUBING OUTFLOW/HYSTEROSCOPY

## (undated) DEVICE — CLIP SPRING 6MM

## (undated) DEVICE — TROCAR ENDOPATH XCEL 11MM 10CM

## (undated) DEVICE — SYR B-D DISP CONTROL 10CC100/C

## (undated) DEVICE — DRESSING ABSRBNT ISLAND 3.6X8

## (undated) DEVICE — CLIP HEMOLOK POLYMER XL 6 CLIP

## (undated) DEVICE — CLOSURE SKIN STERI STRIP 1/2X4

## (undated) DEVICE — SUT 3-0 VICRYL / SH (J416)

## (undated) DEVICE — GLOVE SURG ULTRA TOUCH 7

## (undated) DEVICE — PAD SANITARY OB STERILE

## (undated) DEVICE — DRAPE UNDER BUTTOCKS WITH POUCH

## (undated) DEVICE — TRAY DRY SKIN SCRUB PREP

## (undated) DEVICE — TUBING INFLOW HYSTEROSCOPY

## (undated) DEVICE — DRESSING TRANS 2X2 TEGADERM

## (undated) DEVICE — TRAY FOLEY 16FR INFECTION CONT

## (undated) DEVICE — SET IRR URLGY 2LINE UNIV SPIKE

## (undated) DEVICE — CANNULA LAP SEAL Z THRD 5X100

## (undated) DEVICE — KIT GELPORT LAPAROSCOPIC ABD

## (undated) DEVICE — SPONGE LAP 18X18 PREWASHED

## (undated) DEVICE — SYR 10CC LUER LOCK

## (undated) DEVICE — APPLIER CLIP ENDO MED/LG 10MM

## (undated) DEVICE — Device

## (undated) DEVICE — SLEEVE SCD EXPRESS CALF MEDIUM

## (undated) DEVICE — SEE MEDLINE ITEM 146372

## (undated) DEVICE — SYR 30CC LUER LOCK

## (undated) DEVICE — GAUZE SPONGE XRAY 4X4

## (undated) DEVICE — NDL ECLIPSE SAFETY 18GX1-1/2IN

## (undated) DEVICE — GLOVE PI ULTRA TOUCH G SURGEON

## (undated) DEVICE — NDL ONLY ECLIPSE 25G X5/8